# Patient Record
Sex: FEMALE | Race: WHITE | Employment: OTHER | URBAN - METROPOLITAN AREA
[De-identification: names, ages, dates, MRNs, and addresses within clinical notes are randomized per-mention and may not be internally consistent; named-entity substitution may affect disease eponyms.]

---

## 2019-01-07 ENCOUNTER — TRANSFERRED RECORDS (OUTPATIENT)
Dept: HEALTH INFORMATION MANAGEMENT | Facility: CLINIC | Age: 84
End: 2019-01-07

## 2019-05-31 LAB
ALT SERPL-CCNC: 20 IU/L (ref 8–45)
AST SERPL-CCNC: 25 IU/L (ref 2–40)
CHOLEST SERPL-MCNC: 237 MG/DL (ref 100–199)
CREAT SERPL-MCNC: 1.09 MG/DL (ref 0.57–1.11)
GFR SERPL CREATININE-BSD FRML MDRD: 48 ML/MIN/1.73M2
GLUCOSE SERPL-MCNC: 97 MG/DL (ref 65–100)
HDLC SERPL-MCNC: 49 MG/DL
LDLC SERPL CALC-MCNC: 156 MG/DL
NONHDLC SERPL-MCNC: 188 MG/DL
POTASSIUM SERPL-SCNC: 4.3 MMOL/L (ref 3.5–5)
TRIGL SERPL-MCNC: 161 MG/DL
TSH SERPL-ACNC: 3.42 UIU/ML (ref 0.35–4.94)

## 2019-06-16 ENCOUNTER — MEDICAL CORRESPONDENCE (OUTPATIENT)
Dept: HEALTH INFORMATION MANAGEMENT | Facility: CLINIC | Age: 84
End: 2019-06-16

## 2019-06-18 ENCOUNTER — MEDICAL CORRESPONDENCE (OUTPATIENT)
Dept: HEALTH INFORMATION MANAGEMENT | Facility: CLINIC | Age: 84
End: 2019-06-18

## 2019-06-18 DIAGNOSIS — Z86.73 HISTORY OF STROKE: Primary | ICD-10-CM

## 2019-06-21 ENCOUNTER — HOSPITAL ENCOUNTER (OUTPATIENT)
Dept: CARDIOLOGY | Facility: CLINIC | Age: 84
Discharge: HOME | End: 2019-06-21
Attending: PSYCHIATRY & NEUROLOGY | Admitting: PSYCHIATRY & NEUROLOGY
Payer: COMMERCIAL

## 2019-06-21 ENCOUNTER — HOSPITAL ENCOUNTER (OUTPATIENT)
Dept: ULTRASOUND IMAGING | Facility: CLINIC | Age: 84
End: 2019-06-21
Attending: PSYCHIATRY & NEUROLOGY
Payer: COMMERCIAL

## 2019-06-21 DIAGNOSIS — Z86.73 HISTORY OF STROKE: ICD-10-CM

## 2019-06-21 DIAGNOSIS — I63.9 STROKE (H): Primary | ICD-10-CM

## 2019-06-21 DIAGNOSIS — I63.9 STROKE (H): ICD-10-CM

## 2019-06-21 PROCEDURE — 93880 EXTRACRANIAL BILAT STUDY: CPT

## 2019-06-28 ENCOUNTER — HOSPITAL ENCOUNTER (OUTPATIENT)
Dept: CARDIOLOGY | Facility: CLINIC | Age: 84
Discharge: HOME | End: 2019-06-28
Attending: PSYCHIATRY & NEUROLOGY | Admitting: PSYCHIATRY & NEUROLOGY
Payer: COMMERCIAL

## 2019-06-28 DIAGNOSIS — I63.9 STROKE (H): ICD-10-CM

## 2019-06-28 PROCEDURE — 93226 XTRNL ECG REC<48 HR SCAN A/R: CPT

## 2019-06-28 PROCEDURE — 93227 XTRNL ECG REC<48 HR R&I: CPT | Performed by: INTERNAL MEDICINE

## 2019-09-20 LAB — EJECTION FRACTION: NORMAL %

## 2019-12-30 LAB
ALT SERPL-CCNC: 17 IU/L (ref 8–45)
AST SERPL-CCNC: 24 IU/L (ref 2–40)
CREAT SERPL-MCNC: 1.09 MG/DL (ref 0.57–1.11)
GFR SERPL CREATININE-BSD FRML MDRD: 48 ML/MIN/1.73M2
GLUCOSE SERPL-MCNC: 98 MG/DL (ref 65–100)
POTASSIUM SERPL-SCNC: 4.3 MMOL/L (ref 3.5–5)

## 2020-01-07 ENCOUNTER — MEDICAL CORRESPONDENCE (OUTPATIENT)
Dept: HEALTH INFORMATION MANAGEMENT | Facility: CLINIC | Age: 85
End: 2020-01-07

## 2020-01-07 ENCOUNTER — TRANSFERRED RECORDS (OUTPATIENT)
Dept: HEALTH INFORMATION MANAGEMENT | Facility: CLINIC | Age: 85
End: 2020-01-07

## 2020-01-08 LAB
CHOLEST SERPL-MCNC: 188 MG/DL (ref 100–199)
HDLC SERPL-MCNC: 49 MG/DL
LDLC SERPL CALC-MCNC: 117 MG/DL
NONHDLC SERPL-MCNC: 139 MG/DL
TRIGL SERPL-MCNC: 110 MG/DL

## 2020-01-21 ENCOUNTER — HOSPITAL ENCOUNTER (OUTPATIENT)
Dept: CARDIAC REHAB | Facility: CLINIC | Age: 85
End: 2020-01-21
Attending: INTERNAL MEDICINE
Payer: COMMERCIAL

## 2020-01-21 PROCEDURE — 93798 PHYS/QHP OP CAR RHAB W/ECG: CPT | Performed by: OCCUPATIONAL THERAPIST

## 2020-01-21 PROCEDURE — 40000116 ZZH STATISTIC OP CR VISIT: Performed by: OCCUPATIONAL THERAPIST

## 2020-01-21 PROCEDURE — 93797 PHYS/QHP OP CAR RHAB WO ECG: CPT | Performed by: OCCUPATIONAL THERAPIST

## 2020-01-21 PROCEDURE — 40000575 ZZH STATISTIC OP CARDIAC VISIT #2: Performed by: OCCUPATIONAL THERAPIST

## 2020-01-27 ENCOUNTER — HOSPITAL ENCOUNTER (OUTPATIENT)
Dept: CARDIAC REHAB | Facility: CLINIC | Age: 85
End: 2020-01-27
Attending: INTERNAL MEDICINE
Payer: COMMERCIAL

## 2020-01-27 PROCEDURE — 93798 PHYS/QHP OP CAR RHAB W/ECG: CPT | Performed by: REHABILITATION PRACTITIONER

## 2020-01-27 PROCEDURE — 40000116 ZZH STATISTIC OP CR VISIT: Performed by: REHABILITATION PRACTITIONER

## 2020-02-03 ENCOUNTER — HOSPITAL ENCOUNTER (OUTPATIENT)
Dept: CARDIAC REHAB | Facility: CLINIC | Age: 85
End: 2020-02-03
Attending: INTERNAL MEDICINE
Payer: COMMERCIAL

## 2020-02-03 PROCEDURE — 40000116 ZZH STATISTIC OP CR VISIT: Performed by: CLINICAL EXERCISE PHYSIOLOGIST

## 2020-02-03 PROCEDURE — 93798 PHYS/QHP OP CAR RHAB W/ECG: CPT | Performed by: CLINICAL EXERCISE PHYSIOLOGIST

## 2020-02-17 ENCOUNTER — HOSPITAL ENCOUNTER (OUTPATIENT)
Dept: CARDIAC REHAB | Facility: CLINIC | Age: 85
End: 2020-02-17
Attending: INTERNAL MEDICINE
Payer: COMMERCIAL

## 2020-02-17 PROCEDURE — 40000116 ZZH STATISTIC OP CR VISIT

## 2020-02-17 PROCEDURE — 93798 PHYS/QHP OP CAR RHAB W/ECG: CPT

## 2020-02-19 ENCOUNTER — HOSPITAL ENCOUNTER (OUTPATIENT)
Dept: CARDIAC REHAB | Facility: CLINIC | Age: 85
End: 2020-02-19
Attending: INTERNAL MEDICINE
Payer: COMMERCIAL

## 2020-02-19 PROCEDURE — 93798 PHYS/QHP OP CAR RHAB W/ECG: CPT

## 2020-02-19 PROCEDURE — 40000116 ZZH STATISTIC OP CR VISIT

## 2020-02-24 ENCOUNTER — HOSPITAL ENCOUNTER (OUTPATIENT)
Dept: CARDIAC REHAB | Facility: CLINIC | Age: 85
End: 2020-02-24
Attending: INTERNAL MEDICINE
Payer: COMMERCIAL

## 2020-02-24 PROCEDURE — 40000116 ZZH STATISTIC OP CR VISIT: Performed by: CLINICAL EXERCISE PHYSIOLOGIST

## 2020-02-24 PROCEDURE — 93798 PHYS/QHP OP CAR RHAB W/ECG: CPT | Performed by: CLINICAL EXERCISE PHYSIOLOGIST

## 2020-02-27 ENCOUNTER — HOSPITAL ENCOUNTER (OUTPATIENT)
Dept: CARDIAC REHAB | Facility: CLINIC | Age: 85
End: 2020-02-27
Attending: INTERNAL MEDICINE
Payer: COMMERCIAL

## 2020-02-27 PROCEDURE — 93798 PHYS/QHP OP CAR RHAB W/ECG: CPT | Performed by: REHABILITATION PRACTITIONER

## 2020-02-27 PROCEDURE — 40000116 ZZH STATISTIC OP CR VISIT: Performed by: REHABILITATION PRACTITIONER

## 2020-03-02 ENCOUNTER — HOSPITAL ENCOUNTER (OUTPATIENT)
Dept: CARDIAC REHAB | Facility: CLINIC | Age: 85
End: 2020-03-02
Attending: INTERNAL MEDICINE
Payer: COMMERCIAL

## 2020-03-02 PROCEDURE — 93798 PHYS/QHP OP CAR RHAB W/ECG: CPT | Performed by: CLINICAL EXERCISE PHYSIOLOGIST

## 2020-03-02 PROCEDURE — 40000116 ZZH STATISTIC OP CR VISIT: Performed by: CLINICAL EXERCISE PHYSIOLOGIST

## 2020-03-04 ENCOUNTER — HOSPITAL ENCOUNTER (OUTPATIENT)
Dept: CARDIAC REHAB | Facility: CLINIC | Age: 85
End: 2020-03-04
Attending: INTERNAL MEDICINE
Payer: COMMERCIAL

## 2020-03-04 PROCEDURE — 93798 PHYS/QHP OP CAR RHAB W/ECG: CPT

## 2020-03-04 PROCEDURE — 40000116 ZZH STATISTIC OP CR VISIT

## 2020-03-06 ENCOUNTER — HOSPITAL ENCOUNTER (OUTPATIENT)
Dept: CARDIAC REHAB | Facility: CLINIC | Age: 85
End: 2020-03-06
Attending: INTERNAL MEDICINE
Payer: COMMERCIAL

## 2020-03-06 PROCEDURE — 93798 PHYS/QHP OP CAR RHAB W/ECG: CPT | Performed by: OCCUPATIONAL THERAPIST

## 2020-03-06 PROCEDURE — 40000116 ZZH STATISTIC OP CR VISIT: Performed by: OCCUPATIONAL THERAPIST

## 2020-05-21 ENCOUNTER — HOSPITAL ENCOUNTER (OUTPATIENT)
Dept: CARDIAC REHAB | Facility: CLINIC | Age: 85
End: 2020-05-21
Attending: INTERNAL MEDICINE
Payer: COMMERCIAL

## 2020-05-21 PROCEDURE — 93798 PHYS/QHP OP CAR RHAB W/ECG: CPT | Performed by: CLINICAL EXERCISE PHYSIOLOGIST

## 2020-05-21 PROCEDURE — 40000116 ZZH STATISTIC OP CR VISIT: Performed by: CLINICAL EXERCISE PHYSIOLOGIST

## 2020-05-26 ENCOUNTER — HOSPITAL ENCOUNTER (OUTPATIENT)
Dept: CARDIAC REHAB | Facility: CLINIC | Age: 85
End: 2020-05-26
Attending: INTERNAL MEDICINE
Payer: COMMERCIAL

## 2020-05-26 PROCEDURE — 40000116 ZZH STATISTIC OP CR VISIT

## 2020-05-26 PROCEDURE — 93798 PHYS/QHP OP CAR RHAB W/ECG: CPT

## 2020-05-27 ENCOUNTER — HOSPITAL ENCOUNTER (OUTPATIENT)
Dept: CARDIAC REHAB | Facility: CLINIC | Age: 85
End: 2020-05-27
Attending: INTERNAL MEDICINE
Payer: COMMERCIAL

## 2020-05-27 PROCEDURE — 40000116 ZZH STATISTIC OP CR VISIT: Performed by: OCCUPATIONAL THERAPIST

## 2020-05-27 PROCEDURE — 93798 PHYS/QHP OP CAR RHAB W/ECG: CPT | Performed by: OCCUPATIONAL THERAPIST

## 2020-06-01 ENCOUNTER — HOSPITAL ENCOUNTER (OUTPATIENT)
Dept: CARDIAC REHAB | Facility: CLINIC | Age: 85
End: 2020-06-01
Attending: INTERNAL MEDICINE
Payer: COMMERCIAL

## 2020-06-01 PROCEDURE — 93798 PHYS/QHP OP CAR RHAB W/ECG: CPT | Performed by: CLINICAL EXERCISE PHYSIOLOGIST

## 2020-06-01 PROCEDURE — 40000116 ZZH STATISTIC OP CR VISIT: Performed by: CLINICAL EXERCISE PHYSIOLOGIST

## 2020-06-03 ENCOUNTER — HOSPITAL ENCOUNTER (OUTPATIENT)
Dept: CARDIAC REHAB | Facility: CLINIC | Age: 85
End: 2020-06-03
Attending: INTERNAL MEDICINE
Payer: COMMERCIAL

## 2020-06-03 PROCEDURE — 40000116 ZZH STATISTIC OP CR VISIT: Performed by: REHABILITATION PRACTITIONER

## 2020-06-03 PROCEDURE — 93798 PHYS/QHP OP CAR RHAB W/ECG: CPT | Performed by: REHABILITATION PRACTITIONER

## 2020-06-10 ENCOUNTER — HOSPITAL ENCOUNTER (OUTPATIENT)
Dept: CARDIAC REHAB | Facility: CLINIC | Age: 85
End: 2020-06-10
Attending: INTERNAL MEDICINE
Payer: COMMERCIAL

## 2020-06-10 PROCEDURE — 40000116 ZZH STATISTIC OP CR VISIT: Performed by: OCCUPATIONAL THERAPIST

## 2020-06-10 PROCEDURE — 93798 PHYS/QHP OP CAR RHAB W/ECG: CPT | Performed by: OCCUPATIONAL THERAPIST

## 2020-06-15 ENCOUNTER — HOSPITAL ENCOUNTER (OUTPATIENT)
Dept: CARDIAC REHAB | Facility: CLINIC | Age: 85
End: 2020-06-15
Attending: INTERNAL MEDICINE
Payer: COMMERCIAL

## 2020-06-15 PROCEDURE — 40000116 ZZH STATISTIC OP CR VISIT: Performed by: REHABILITATION PRACTITIONER

## 2020-06-15 PROCEDURE — 93798 PHYS/QHP OP CAR RHAB W/ECG: CPT | Performed by: REHABILITATION PRACTITIONER

## 2020-09-16 ENCOUNTER — HOSPITAL ENCOUNTER (OUTPATIENT)
Dept: CARDIAC REHAB | Facility: CLINIC | Age: 85
End: 2020-09-16
Attending: INTERNAL MEDICINE
Payer: COMMERCIAL

## 2020-09-16 PROCEDURE — 93798 PHYS/QHP OP CAR RHAB W/ECG: CPT | Performed by: OCCUPATIONAL THERAPIST

## 2020-09-16 PROCEDURE — 40000116 ZZH STATISTIC OP CR VISIT: Performed by: OCCUPATIONAL THERAPIST

## 2020-09-24 ENCOUNTER — HOSPITAL ENCOUNTER (OUTPATIENT)
Dept: CARDIAC REHAB | Facility: CLINIC | Age: 85
End: 2020-09-24
Attending: INTERNAL MEDICINE
Payer: COMMERCIAL

## 2020-09-24 PROCEDURE — 93798 PHYS/QHP OP CAR RHAB W/ECG: CPT

## 2020-09-24 PROCEDURE — 40000116 ZZH STATISTIC OP CR VISIT

## 2020-10-01 ENCOUNTER — HOSPITAL ENCOUNTER (OUTPATIENT)
Dept: CARDIAC REHAB | Facility: CLINIC | Age: 85
End: 2020-10-01
Attending: INTERNAL MEDICINE
Payer: COMMERCIAL

## 2020-10-01 PROCEDURE — 999N000109 HC STATISTIC OP CR VISIT

## 2020-10-01 PROCEDURE — 93798 PHYS/QHP OP CAR RHAB W/ECG: CPT

## 2020-10-08 ENCOUNTER — HOSPITAL ENCOUNTER (OUTPATIENT)
Dept: CARDIAC REHAB | Facility: CLINIC | Age: 85
End: 2020-10-08
Attending: INTERNAL MEDICINE
Payer: COMMERCIAL

## 2020-10-08 PROCEDURE — 999N000109 HC STATISTIC OP CR VISIT

## 2020-10-08 PROCEDURE — 93798 PHYS/QHP OP CAR RHAB W/ECG: CPT

## 2020-10-15 ENCOUNTER — HOSPITAL ENCOUNTER (OUTPATIENT)
Dept: CARDIAC REHAB | Facility: CLINIC | Age: 85
End: 2020-10-15
Attending: INTERNAL MEDICINE
Payer: COMMERCIAL

## 2020-10-15 PROCEDURE — 999N000109 HC STATISTIC OP CR VISIT: Performed by: REHABILITATION PRACTITIONER

## 2020-10-15 PROCEDURE — 93798 PHYS/QHP OP CAR RHAB W/ECG: CPT | Performed by: REHABILITATION PRACTITIONER

## 2020-10-22 ENCOUNTER — HOSPITAL ENCOUNTER (OUTPATIENT)
Dept: CARDIAC REHAB | Facility: CLINIC | Age: 85
End: 2020-10-22
Attending: INTERNAL MEDICINE
Payer: COMMERCIAL

## 2020-10-22 PROCEDURE — 93798 PHYS/QHP OP CAR RHAB W/ECG: CPT

## 2020-10-22 PROCEDURE — 999N000109 HC STATISTIC OP CR VISIT

## 2020-11-05 ENCOUNTER — HOSPITAL ENCOUNTER (OUTPATIENT)
Dept: CARDIAC REHAB | Facility: CLINIC | Age: 85
End: 2020-11-05
Attending: INTERNAL MEDICINE
Payer: COMMERCIAL

## 2020-11-05 PROCEDURE — 93798 PHYS/QHP OP CAR RHAB W/ECG: CPT

## 2020-11-05 PROCEDURE — 999N000109 HC STATISTIC OP CR VISIT

## 2020-11-12 ENCOUNTER — HOSPITAL ENCOUNTER (OUTPATIENT)
Dept: CARDIAC REHAB | Facility: CLINIC | Age: 85
End: 2020-11-12
Attending: INTERNAL MEDICINE
Payer: COMMERCIAL

## 2020-11-12 PROCEDURE — 93798 PHYS/QHP OP CAR RHAB W/ECG: CPT | Performed by: CLINICAL EXERCISE PHYSIOLOGIST

## 2020-11-12 PROCEDURE — 999N000109 HC STATISTIC OP CR VISIT: Performed by: CLINICAL EXERCISE PHYSIOLOGIST

## 2020-11-19 ENCOUNTER — HOSPITAL ENCOUNTER (OUTPATIENT)
Dept: CARDIAC REHAB | Facility: CLINIC | Age: 85
End: 2020-11-19
Attending: INTERNAL MEDICINE
Payer: COMMERCIAL

## 2020-11-19 PROCEDURE — 999N000109 HC STATISTIC OP CR VISIT

## 2020-11-19 PROCEDURE — 93798 PHYS/QHP OP CAR RHAB W/ECG: CPT

## 2020-12-03 ENCOUNTER — HOSPITAL ENCOUNTER (OUTPATIENT)
Dept: CARDIAC REHAB | Facility: CLINIC | Age: 85
End: 2020-12-03
Attending: INTERNAL MEDICINE
Payer: COMMERCIAL

## 2020-12-03 PROCEDURE — 999N000109 HC STATISTIC OP CR VISIT

## 2020-12-03 PROCEDURE — 93798 PHYS/QHP OP CAR RHAB W/ECG: CPT

## 2020-12-10 ENCOUNTER — HOSPITAL ENCOUNTER (OUTPATIENT)
Dept: CARDIAC REHAB | Facility: CLINIC | Age: 85
End: 2020-12-10
Attending: INTERNAL MEDICINE
Payer: COMMERCIAL

## 2020-12-10 PROCEDURE — 999N000109 HC STATISTIC OP CR VISIT

## 2020-12-10 PROCEDURE — 93798 PHYS/QHP OP CAR RHAB W/ECG: CPT

## 2021-03-14 ENCOUNTER — APPOINTMENT (OUTPATIENT)
Dept: CT IMAGING | Facility: CLINIC | Age: 86
End: 2021-03-14
Attending: EMERGENCY MEDICINE
Payer: COMMERCIAL

## 2021-03-14 ENCOUNTER — HOSPITAL ENCOUNTER (EMERGENCY)
Facility: CLINIC | Age: 86
Discharge: HOME | End: 2021-03-14
Attending: EMERGENCY MEDICINE | Admitting: EMERGENCY MEDICINE
Payer: COMMERCIAL

## 2021-03-14 VITALS
TEMPERATURE: 97.5 F | OXYGEN SATURATION: 97 % | HEART RATE: 63 BPM | RESPIRATION RATE: 18 BRPM | DIASTOLIC BLOOD PRESSURE: 72 MMHG | SYSTOLIC BLOOD PRESSURE: 167 MMHG

## 2021-03-14 DIAGNOSIS — T07.XXXA MULTIPLE CONTUSIONS: ICD-10-CM

## 2021-03-14 DIAGNOSIS — S81.811A SKIN TEAR OF RIGHT LOWER LEG WITHOUT COMPLICATION, INITIAL ENCOUNTER: ICD-10-CM
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
## 2021-03-14 DIAGNOSIS — S02.92XA MULTIPLE CLOSED FRACTURES OF FACIAL BONE, INITIAL ENCOUNTER (H): ICD-10-CM

## 2021-03-14 DIAGNOSIS — S01.81XA FACIAL LACERATION, INITIAL ENCOUNTER: ICD-10-CM

## 2021-03-14 PROCEDURE — 70450 CT HEAD/BRAIN W/O DYE: CPT

## 2021-03-14 PROCEDURE — 99284 EMERGENCY DEPT VISIT MOD MDM: CPT | Mod: 25

## 2021-03-14 PROCEDURE — 250N000009 HC RX 250: Performed by: EMERGENCY MEDICINE

## 2021-03-14 PROCEDURE — 12013 RPR F/E/E/N/L/M 2.6-5.0 CM: CPT

## 2021-03-14 PROCEDURE — 70486 CT MAXILLOFACIAL W/O DYE: CPT

## 2021-03-14 RX ORDER — LIDOCAINE HYDROCHLORIDE 20 MG/ML
5 SOLUTION OROPHARYNGEAL ONCE
Status: COMPLETED | OUTPATIENT
Start: 2021-03-14 | End: 2021-03-14

## 2021-03-14 RX ADMIN — LIDOCAINE HYDROCHLORIDE 5 ML: 20 SOLUTION ORAL; TOPICAL at 13:20

## 2021-03-14 ASSESSMENT — ENCOUNTER SYMPTOMS: WOUND: 1

## 2021-03-14 NOTE — ED TRIAGE NOTES
Pt arrives via EMS for evaluation following fall and head injury. Pt presents from [place] where pt tripped on the escalator. Pt hit head on right side of her face. Swelling on right side of the face. Pt's nose started to bleed when EMS arrived. Abrasion on bleeding of right lower leg. Pt takes Plavix.

## 2021-03-14 NOTE — ED NOTES
Bed: ED28  Expected date:   Expected time:   Means of arrival:   Comments:  Vandana 436 Fall off escalator lac 85 f

## 2021-03-14 NOTE — CONSULTS
"[facility] TRAUMA CONSULTATION NOTE    ASSESSMENT :   Mariam Azar is an 85 year old female with PMH significant for CAD, HTN, depression, and sciatica s/p ground-level fall sustaining right minimally-displaced zygomaticomaxillary complex fracture with right brow laceration.     PLAN/RECOMMENDATIONS:   1. No acute surgical intervention indicated  2. Strict sinus precautions (No nose blowing, no closed mouth sneezing, no smoking, no straws)  3. Head of bed elevated  4. Ice to face, 20min on/off for first 48hrs  5. Re-eval in 8 days (3/22) for suture removal, abx ointment TID until removed  6. Stable for discharge from [facility] perspective      Brant Ramirez DDS, MD  [facility]  [address], [address]  [place] [address]  Clinic/On call [phone]  Clinic Fax [phone]    If questions, please call  and ask to be connected to Oral and Maxillofacial Surgeon on call    CHIEF COMPLAINT:  \"I fell at the end of the escalator\"    HISTORY OF PRESENT ILLNESS:   Mariam Azar is an 85 year old female with PMH significant for CAD, HTN, depression, and sciatica s/p ground-level fall sustaining right minimally-displaced zygomaticomaxillary complex fracture with right brow laceration. Patient was going down the cart escalator at [facility] when she fell at the bottom, hitting her right cheek on the concrete. No LOC. Denies HA, blurred/double vision, n/V. Patient was transported to [facility] via ambulance. Patient is on Plavix.     Prior to examination, the patient underwent CT of the face and head revealing right ZMC fracture. Facial laceration was closed by ED with non-resorbable suture.        REVIEW OF SYSTEMS:  Please refer to HPI.    PAST MEDICAL HISTORY:  Past Medical History:   Diagnosis Date     Depressive disorder      Hypertension        CURRENT MEDICATIONS:     No current facility-administered medications for this encounter.      Current Outpatient Medications   Medication "     ASPIRIN ADULT LOW STRENGTH PO     Citalopram Hydrobromide (CELEXA PO)     METOPROLOL TARTRATE PO     oxybutynin (DITROPAN-XL) 5 MG 24 hr tablet       ALLERGIES/SENSITIVITIES:  No Known Allergies    PAST SURGICAL HISTORY:  Past Surgical History:   Procedure Laterality Date     BREAST SURGERY      breast reduction     COLONOSCOPY N/A 12/8/2015    Procedure: COLONOSCOPY;  Surgeon: Anand Cameron MD;  Location:  GI     ENT SURGERY      tonsillectomy   Denies bleeding or anesthesia complications.    FAMILY HISTORY:  Denies bleeding or anesthesia complications.     SOCIAL HISTORY:  Social History     Socioeconomic History     Marital status: Single     Spouse name: Not on file     Number of children: Not on file     Years of education: Not on file     Highest education level: Not on file   Occupational History     Not on file   Social Needs     Financial resource strain: Not on file     Food insecurity     Worry: Not on file     Inability: Not on file     Transportation needs     Medical: Not on file     Non-medical: Not on file   Tobacco Use     Smoking status: Never Smoker   Substance and Sexual Activity     Alcohol use: No     Drug use: Not on file     Sexual activity: Not on file   Lifestyle     Physical activity     Days per week: Not on file     Minutes per session: Not on file     Stress: Not on file   Relationships     Social connections     Talks on phone: Not on file     Gets together: Not on file     Attends Worship service: Not on file     Active member of club or organization: Not on file     Attends meetings of clubs or organizations: Not on file     Relationship status: Not on file     Intimate partner violence     Fear of current or ex partner: Not on file     Emotionally abused: Not on file     Physically abused: Not on file     Forced sexual activity: Not on file   Other Topics Concern     Parent/sibling w/ CABG, MI or angioplasty before 65F 55M? Not Asked   Social History Narrative      Not on file       PHYSICAL EXAM:  Vital signs:   BP (!) 167/72   Pulse 63   Temp 97.5  F (36.4  C) (Temporal)   Resp 18   SpO2 97%    Gen: Sitting in bed, alert, awake, and oriented x 3, No acute distress.  Head: Normocephalic, atraumatic. No scalp lacerations or hematomas.   Eyes: Extra-ocular movement intact, pupils equal, round, and reactive to light and accomodation. No lid edema, ecchymosis. Fields of gaze intact. 3cm right lateral brow laceration repaired with 5 interrupted prolene suture.   Ears: Hearing grossly intact bilaterally with hearing aids. No blood noted in the external meatus. No lacerations or hematoma. No harris's signs.  Nose: No deviation. No tenderness to palpation or crepitus. No lacerations. No septal hematoma. Nares patent bilaterally. Some dried blood in bilateral nares.   Extra-oral: Some right TMJ tenderness. No TMJ clicking, popping. No trismus or mandibular deviation on opening. No CN V1 or V3 paresthesia. Right V2 paresthesia. No cervical lymphadenopathy. No thyromegaly. Inferior borders and angles of bilateral mandible are palpable.   Intra-oral: No lip or buccal mucosal lacerations or lesions. Occlusion stable and reproducible with positive overbite and overjet. No occlusal steps or gingival tears. No tongue or floor of mouth lesions, lacerations or hematomas. Floor of mouth non-elevated. Uvula midline.No palatal or vestibular swelling. Oral hygiene fair. No traumatically fractured or missing teeth.   CV: Normal peripheral perfusion. Regular rate and rhythm.  Lungs: Non-labored breathing. Satting above 92% on room air.    Neuro: Moving all extremities  Psych: Cooperative, appropriate affect    LABS:  No visits with results within 1 Day(s) from this visit.   Latest known visit with results is:   Transferred Records on 01/07/2020   Component Date Value Ref Range Status     Cholesterol 01/08/2020 188  100 - 199 mg/dL Final     Triglycerides 01/08/2020 110  <150 mg/dL Final     HDL  Cholesterol 01/08/2020 49  >40 mg/dL Final     LDL Cholesterol Calculated 01/08/2020 117  <=130 mg/dL Final     Non HDL Cholesterol 01/08/2020 139  <145 mg/dL Final     ALT 12/30/2019 17  8 - 45 IU/L Final     AST 12/30/2019 24  2 - 40 IU/L Final     Creatinine 12/30/2019 1.09  0.57 - 1.11 mg/dL Final     GFR Estimate 12/30/2019 48* >60 ml/min/1.73m2 Final     GFR Estimate If Black 12/30/2019 58* >60 ml/min/1.73m2 Final     Glucose 12/30/2019 98  65 - 100 mg/dL Final     Potassium 12/30/2019 4.3  3.5 - 5.0 mmol/L Final     ALT 05/31/2019 20  8 - 45 IU/L Final     AST 05/31/2019 25  2 - 40 IU/L Final     Creatinine 05/31/2019 1.09  0.57 - 1.11 mg/dL Final     GFR Estimate 05/31/2019 48* >60 ml/min/1.73m2 Final     GFR Estimate If Black 05/31/2019 58* >60 ml/min/1.73m2 Final     Glucose 05/31/2019 97  65 - 100 mg/dL Final     Potassium 05/31/2019 4.3  3.5 - 5.0 mmol/L Final     Cholesterol 05/31/2019 237* 100 - 199 mg/dL Final     Triglycerides 05/31/2019 161* <150 mg/dL Final     HDL Cholesterol 05/31/2019 49  >40 mg/dL Final     LDL Cholesterol Calculated 05/31/2019 156* <=130 mg/dL Final     Non HDL Cholesterol 05/31/2019 188* <145 mg/dL Final     TSH 05/31/2019 3.42  0.35 - 4.94 uIU/mL Final     Ejection Fraction 09/20/2019 55-60  % Final        IMAGING:  The following imaging studies were reviewed. I agree with the radiologist's interpretation unless otherwise noted.   No images are attached to the encounter.   Results for orders placed or performed during the hospital encounter of 03/14/21   CT Head w/o Contrast    Narrative    CT HEAD WITHOUT CONTRAST 3/14/2021 1:12 PM    INDICATION: Head trauma, minor (Age >= 65y).  TECHNIQUE: CT scan of the head without contrast. Dose reduction  techniques were used.  CONTRAST:  None.  COMPARISON: Facial bone CT 3/14/2021    FINDINGS:  No intracranial hemorrhage, extraaxial collection, mass  effect or CT evidence of acute infarct.  Mild-to-moderate presumed  chronic small  vessel ischemic changes. Mild generalized volume loss.  The ventricles are proportional to the sulci. Please see the  separately reported facial bone CT regarding the right facial bone  fractures affecting the right orbit and maxillary sinus. Blood  occupies most of the visualized right maxillary sinus. Clear mastoid  air cells.      Impression    IMPRESSION:  1. No acute intracranial injury.  2. Please see the separately reported facial bone CT regarding the  right facial fractures.    JUS WALTON MD   CT Facial Bones without Contrast    Narrative    CT FACIAL BONES WITHOUT CONTRAST 3/14/2021 1:11 PM    INDICATION: Trauma, facial injury.  TECHNIQUE: CT scan of the face without contrast. Dose reduction  techniques were used.  CONTRAST: None.  COMPARISON: None.    FINDINGS:   There are acute minimally displaced fractures of the anterior and  posterior lateral walls of the right maxillary sinus and overlying  orbital floor and inferolateral orbital wall. The orbital floor  fracture involves the infraorbital canal. There is a very small amount  of intraorbital hemorrhage near the inferior orbital fissure opening.  Extraocular muscles, globe and lens appear intact by CT. There is some  buckling of the right zygomatic arch without definite fracture. There  is subcutaneous swelling and hematoma overlying the right face within  the  space. Mandible appears intact. Blood occupies most of  the right maxillary sinus.      Impression    IMPRESSION:  1. Multiple acute right facial bone fractures with features of a  zygomaticomaxillary complex injury. Minimally displaced fractures  involve the inferior and lateral orbital walls and anterior and  posterior maxillary sinus walls.  2. Trace amount of intraorbital hematoma. No proptosis.  3. Right facial swelling/hematoma within the  space.  Mandible appears intact.    JUS WALTON MD

## 2021-03-14 NOTE — ED PROVIDER NOTES
History   Chief Complaint:  Head Injury and Fall       HPI   Mariam Azar is a 85 year old female on plavix with history of CVA and coronary artery disease who presents with a head injury after a fall. The patient's cart got stuck at the bottom of the escalator while at [employer] this morning and she fell. The patient hit her head and right lower leg on the escalator. She complains of pain to the right side of her face, bloody nose, and abrasion to the right lower leg. She was able to walk for EMS. EMS states that the patient's last blood pressure was 140 systolic and heart rate was in the 50s. EMS notes that the patient's last dose of plavix was yesterday in preparation for her upcoming surgery this Thursday.     Review of Systems   HENT: Positive for nosebleeds.         Right sided facial pain   Skin: Positive for wound (abrasion to right lower leg).   10 systems reviewed and negative except as above and in HPI.     Allergies:  No Known Allergies    Medications:  Citalopram Hydrobromide   Metoprolol   Oxybutynin  plavix   Synthroid   crestor  Amlodipine   Aldactone     Past Medical History:    Hypertension   Depressive disorder   Hypothyroidism   Hyperlipidemia    Anxiety   Coronary artery disease   CVA  gout    Past Surgical History:    Breast reduction   Tonsillectomy   carotid angiography stenting     Family History:    Heart attack   Hypertension   Multiple myeloma   Diabetes     Social History:  The patient presents by EMS.   PCP: Edouard Wolf MD      Physical Exam     Patient Vitals for the past 24 hrs:   BP Temp Temp src Pulse Resp SpO2   03/14/21 1330 -- -- -- -- -- 97 %   03/14/21 1240 -- -- -- -- -- 96 %   03/14/21 1220 -- -- -- -- -- 96 %   03/14/21 1215 (!) 167/72 97.5  F (36.4  C) Temporal 63 18 93 %       Physical Exam  General: Resting on the gurney, appears  uncomfortable  Head:  Epistaxis present resolved shortly after my evaluation, 3 cm laceration to the right eyebrow,  periorbital edema and contusion, no hyphema, no pain with extraocular movements, Right cheek swollen and bruised, tenderness over the right maxillary sinus, no tenderness over the mandible, opens mouth without difficulty  Neck:  No midline tenderness to palpation  Mouth/Throat: No dental malalignment  CV:  Regular rate    Normal S1 and S2  No pathological murmur   Resp:  Breath sounds clear and equal bilaterally    Non-labored, no retractions or accessory muscle use    No coarseness    No wheezing   GI:  Abdomen is soft, no rigidity    No tenderness to palpation  MS:  Normal motor assessment of all extremities. Back has no tenderness to palpation  Skin:   left shin large contusion, right shoulder contusion, right shin skin avulsion overlying a large hematoma  Neuro:   Speech is normal and fluent. No apparent deficit.  Psych: Awake. Alert.  Normal affect.      Appropriate interactions.       Emergency Department Course     Imaging:  CT Head w/o Contrast  Final Result  IMPRESSION:  1. No acute intracranial injury.  2. Please see the separately reported facial bone CT regarding the  right facial fractures.  JUS WALTON MD  Reading per radiology     CT Facial Bones without Contrast  Final Result  IMPRESSION:  1. Multiple acute right facial bone fractures with features of a  zygomaticomaxillary complex injury. Minimally displaced fractures  involve the inferior and lateral orbital walls and anterior and  posterior maxillary sinus walls.  2. Trace amount of intraorbital hematoma. No proptosis.  3. Right facial swelling/hematoma within the  space.  Mandible appears intact.  JUS WALTON MD  Reading per radiology     Procedures    Laceration Repair        LACERATION:  A simple and superficial clean 3 cm laceration.      LOCATION:  Right eyebrow      FUNCTION:  Distally sensation and circulation are intact.      ANESTHESIA:  Topical lidocaine 2% total of 5 mLs      PREPARATION:  Irrigation and  Scrubbing with Jm Robertson      DEBRIDEMENT:  no debridement      CLOSURE:  Wound was closed with One Layer.  Skin closed with 5 x 6.0 Ethylon using interrupted sutures.     Emergency Department Course:    Reviewed:  I reviewed the patient's nursing notes, vitals, past medical records, Care Everywhere.     Assessments:  1209  I performed an exam of the patient as documented above.   1420 Patient rechecked and updated. The laceration was repaired as noted above.     Consults:   1400 I spoke with Dr. Ramirez of the [facility] service regarding patient's presentation, findings, and plan of care.      Interventions:  1320 Lidocaine 2% 5 mL topical     Disposition:  Discharged to home.      Impression & Plan     Medical Decision Making:    The patient presents for evaluation of a head injury.  Due to the patient's anticoagulation, they were at risk of intracranial hemorrhage.  CT of the head was obtained and is negative for acute ICH or skull fracture.  The CT head result was discussed with the patient.  The patient's head laceration was repaired as noted above.  Pt was given wound care instructions and will follow up for suture removal.  The leg wound was repaired with steristrips.   Given patients maxillary and orbit injuries and anticoagulation, I contacted [facility] to discuss follow up and risk for enlarging hematoma.  He graciously agreed to see the pt in the ED and saw her at bedside.  He will have the patient come to see him in clinic and gave sinus precautions.  No antibiotics per his recommendation.    Based on a full exam, I did not have concern for any additional traumatic injuries. As she had no bony tenderenss or pain with full range of motion.  The pt describes a mechanical accident and therefore no additional evaluation for metabolic or cardiac etiology was warranted at this time.     Diagnosis:    ICD-10-CM    1. Multiple closed fractures of facial bone, initial encounter (H)  S02.92XA    2. Facial laceration,  initial encounter  S01.81XA    3. Skin tear of right lower leg without complication, initial encounter  S81.811A    4. Multiple contusions  T07.XXXA    5. Fall on or from escalator, initial encounter  W10.0XXA        Scribe Disclosure:  I, Gregg Carter, am serving as a scribe at 12:09 PM on 3/14/2021 to document services personally performed by Rochelle Moncada MD based on my observations and the provider's statements to me.        Rochelle Moncada MD  03/14/21 1834
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
## 2021-03-20 ENCOUNTER — HEALTH MAINTENANCE LETTER (OUTPATIENT)
Age: 86
End: 2021-03-20

## 2021-10-24 ENCOUNTER — HEALTH MAINTENANCE LETTER (OUTPATIENT)
Age: 86
End: 2021-10-24

## 2022-04-10 ENCOUNTER — HEALTH MAINTENANCE LETTER (OUTPATIENT)
Age: 87
End: 2022-04-10

## 2022-05-04 ENCOUNTER — PRE VISIT (OUTPATIENT)
Dept: CARDIOLOGY | Facility: CLINIC | Age: 87
End: 2022-05-04
Payer: COMMERCIAL

## 2022-05-04 PROBLEM — I65.29 CAROTID ARTERY PLAQUE: Status: ACTIVE | Noted: 2022-05-04

## 2022-05-04 PROBLEM — I25.10 CORONARY ARTERY DISEASE INVOLVING NATIVE CORONARY ARTERY OF NATIVE HEART WITHOUT ANGINA PECTORIS: Status: ACTIVE | Noted: 2022-05-04

## 2022-05-04 PROBLEM — M48.00 SPINAL STENOSIS: Status: ACTIVE | Noted: 2022-05-04

## 2022-05-04 PROBLEM — M10.9 GOUT: Status: ACTIVE | Noted: 2022-05-04

## 2022-05-04 PROBLEM — R25.1 TREMOR: Status: ACTIVE | Noted: 2022-05-04

## 2022-05-04 PROBLEM — D64.9 ANEMIA: Status: ACTIVE | Noted: 2022-05-04

## 2022-05-04 PROBLEM — E03.9 HYPOTHYROIDISM: Status: ACTIVE | Noted: 2022-05-04

## 2022-05-04 PROBLEM — I34.0 MITRAL VALVE REGURGITATION: Status: ACTIVE | Noted: 2022-05-04

## 2022-05-04 PROBLEM — E78.5 HYPERLIPIDEMIA LDL GOAL <70: Status: ACTIVE | Noted: 2022-05-04

## 2022-06-01 ENCOUNTER — OFFICE VISIT (OUTPATIENT)
Dept: CARDIOLOGY | Facility: CLINIC | Age: 87
End: 2022-06-01
Payer: COMMERCIAL

## 2022-06-01 VITALS
BODY MASS INDEX: 21.97 KG/M2 | SYSTOLIC BLOOD PRESSURE: 150 MMHG | WEIGHT: 136.7 LBS | DIASTOLIC BLOOD PRESSURE: 71 MMHG | HEIGHT: 66 IN | HEART RATE: 69 BPM

## 2022-06-01 DIAGNOSIS — I25.10 CORONARY ARTERY DISEASE INVOLVING NATIVE CORONARY ARTERY OF NATIVE HEART WITHOUT ANGINA PECTORIS: Primary | ICD-10-CM

## 2022-06-01 DIAGNOSIS — I34.0 NONRHEUMATIC MITRAL VALVE REGURGITATION: ICD-10-CM

## 2022-06-01 DIAGNOSIS — E78.5 HYPERLIPIDEMIA LDL GOAL <70: ICD-10-CM

## 2022-06-01 DIAGNOSIS — I10 BENIGN ESSENTIAL HYPERTENSION: ICD-10-CM

## 2022-06-01 PROCEDURE — 99215 OFFICE O/P EST HI 40 MIN: CPT | Performed by: INTERNAL MEDICINE

## 2022-06-01 PROCEDURE — 93000 ELECTROCARDIOGRAM COMPLETE: CPT | Performed by: INTERNAL MEDICINE

## 2022-06-01 RX ORDER — ROSUVASTATIN CALCIUM 20 MG/1
20 TABLET, COATED ORAL AT BEDTIME
COMMUNITY
Start: 2022-04-05

## 2022-06-01 RX ORDER — CARBIDOPA AND LEVODOPA 25; 100 MG/1; MG/1
TABLET ORAL
COMMUNITY
Start: 2022-03-18

## 2022-06-01 RX ORDER — AMLODIPINE BESYLATE 5 MG/1
5 TABLET ORAL DAILY
COMMUNITY
Start: 2022-05-17

## 2022-06-01 RX ORDER — LEVOTHYROXINE SODIUM 25 UG/1
TABLET ORAL
COMMUNITY
Start: 2022-04-14

## 2022-06-01 RX ORDER — METOPROLOL SUCCINATE 25 MG/1
25 TABLET, EXTENDED RELEASE ORAL DAILY
Qty: 90 TABLET | Refills: 3
Start: 2022-06-01 | End: 2022-06-21

## 2022-06-01 NOTE — PROGRESS NOTES
Service Date: 06/01/2022    HISTORY OF PRESENT ILLNESS:  Mariam is seeking me out as a second opinion and transfer of care.  She formerly followed through [facility].  Her daughter knows my wife and neighbor, Corrie Molina.    PAST MEDICAL HISTORY:  Mariam's past medical history is significant for hypertension, hyperlipidemia, hypothyroidism, Parkinson's disease and known coronary artery disease.    Her coronary history dates back to 01/2020, when she had 5 drug-eluting stents placed to her proximal and mid right coronary artery as well as ostial, proximal circumflex and mid circumflex.  In 09/2020, due to recurrent exertional symptoms, a coronary CT angiogram appeared to demonstrate severe in-stent restenosis in the left anterior descending artery.  Angiography demonstrated iFR-negative disease in the LAD and she underwent angioplasty to in-stent restenosis in her left circumflex coronary artery.  Her September angiogram was complicated by a shower of small strokes noted by MRI.  Her last angiogram also noted a second obtuse marginal that was totally occluded.  Her symptoms post-second angiogram included diplopia and right-sided facial droop.    Mariam returns to clinic at this time, stating that she thinks she is doing fairly well.  She walks a mile 4-5 times a week.  She gets this done under 25 minutes and this does not cause her any symptoms.  She states her blood pressure is checked twice a month and always is in the 125-130 range over 60s.  She perseverates over whether she has Parkinson's.  She has seen 3 different neurologists over the years, 2 have told her that she does not have it and 1 told that she does have it.  I told her I am a cardiologist and since she has 3 neurologists, I would let them decide whether she actually has Parkinson's or essential tremor.    She is on aspirin only.  She stopped her Plavix last September.  She notes no side effects or problems with her current medical  regimen, although has many questions about her medications.    ASSESSMENT/PLAN:  Mariam appears to be doing well from a cardiac standpoint without clinical evidence of ischemia.  Review of Care Everywhere shows she did have an echocardiogram performed in 10/2021, demonstrating an ejection fraction of 65%-70% without focal wall motion abnormalities.  She has a sclerotic mitral valve with mild mitral regurgitation.  Pulmonary pressure is estimated to be 29 mmHg plus her right atrial pressure.    She has no symptoms to suggest heart failure or significant arrhythmia.    Blood pressure was high today at 150/71.  Although given the fact that she is getting her blood pressure checked regularly and it is not normally high, I am not going to intensify her antihypertensive regimen.  We talked about arterial noncompliance.    I congratulated her on her exercise regimen and encouraged her to continue to do so.    Care Everywhere also reveals a fasting lipid profile from August.  Total cholesterol 133, HDL 59, LDL of 51, triglycerides of 114.  We talked about the individual parameters and what these mean.    We reviewed all of her medications.  I will continue them all as is.    Her other complaint is that of fatigue and tiredness and when I talked to her, she states she sleeps 8-9 hours a day.  She has daytime somnolence and feels that she needs to take a nap.   I told her that she most likely has a sleep disorder.  She does snore at nighttime.  I have offered her a sleep study.  She states she had one 10 years ago.  She just wanted to make sure it was not a side effect of medications or a sign of a heart problem.  At this time, she does not want to pursue a sleep study.    Almost an hour was spent with Mariam and her daughter today.  I will have her follow up with [facility] in 6 months.  I will see her back in a year.  If she should have any problems, I would be glad to see her sooner.    Thank you for allowing me to participate  in her care.    Jomar Zelaya MD, FACC        D: 2022   T: 2022   MT: [id number]    Name:     SHAMEKA TURNER  MRN:      [id number]        Account:      [id number]   :      1935           Service Date: 2022       Document: [id number]

## 2022-06-01 NOTE — PATIENT INSTRUCTIONS
Your blood pressure was elevated at your appointment today.  Elevated blood pressure can increase your risk of a heart attack, stroke and heart failure.  For this reason, we feel it is important to monitor your blood pressure closely.  If you have access to a home blood pressure monitor or are able to check your blood pressure at a local pharmacy in the next week we would like you to do so and call our clinic with those readings. Please call [phone] (Danae) and leave a message with your name, date of birth, blood pressure reading, date and location it was completed. If your blood pressure remains elevated your care team will be notified.  We appreciate being a part of your healthcare team and look forward to hearing from you soon.

## 2022-06-01 NOTE — LETTER
6/1/2022    Edouard Wolf MD  [employer] [address]  [facility] [address]    RE: Mariam Azar       Dear Colleague,     I had the pleasure of seeing Mariam Azar in the [facility].  HPI and Plan:   See dictation    Today's clinic visit entailed:  Review of the result(s) of each unique test - EKG, echocardiogram, lab work, cath lab reports  The following tests were independently interpreted by me as noted in my documentation: EKG  Ordering of each unique test  Prescription drug management  45 minutes spent on the date of the encounter doing chart review, history and exam, documentation and further activities per the note  Provider  Link to MDM Help Grid     The level of medical decision making during this visit was of moderate complexity.      Orders Placed This Encounter   Procedures     Basic metabolic panel     Lipid Profile     ALT     [facility]     Follow-Up with Cardiology     EKG 12-lead complete w/read - Clinics (performed today)       Orders Placed This Encounter   Medications     levothyroxine (SYNTHROID/LEVOTHROID) 25 MCG tablet     Sig: TAKE 1 TABLET (25 MCG) BY MOUTH BEFORE BREAKFAST.     amLODIPine (NORVASC) 5 MG tablet     Sig: Take 5 mg by mouth daily     rosuvastatin (CRESTOR) 20 MG tablet     Sig: Take 20 mg by mouth At Bedtime     carbidopa-levodopa (SINEMET)  MG tablet     Sig: TAKE 1 TABLET BY MOUTH TWICE A DAY     metoprolol succinate ER (TOPROL XL) 25 MG 24 hr tablet     Sig: Take 1 tablet (25 mg) by mouth daily     Dispense:  90 tablet     Refill:  3       Medications Discontinued During This Encounter   Medication Reason     METOPROLOL TARTRATE PO          Encounter Diagnoses   Name Primary?     Coronary artery disease involving native coronary artery of native heart without angina pectoris Yes     Hyperlipidemia LDL goal <70      Nonrheumatic mitral valve regurgitation      Benign essential hypertension        CURRENT  MEDICATIONS:  Current Outpatient Medications   Medication Sig Dispense Refill     amLODIPine (NORVASC) 5 MG tablet Take 5 mg by mouth daily       ASPIRIN ADULT LOW STRENGTH PO        carbidopa-levodopa (SINEMET)  MG tablet TAKE 1 TABLET BY MOUTH TWICE A DAY       Citalopram Hydrobromide (CELEXA PO) Take 10 mg by mouth       levothyroxine (SYNTHROID/LEVOTHROID) 25 MCG tablet TAKE 1 TABLET (25 MCG) BY MOUTH BEFORE BREAKFAST.       metoprolol succinate ER (TOPROL XL) 25 MG 24 hr tablet Take 1 tablet (25 mg) by mouth daily 90 tablet 3     oxybutynin (DITROPAN-XL) 5 MG 24 hr tablet Take by mouth daily       rosuvastatin (CRESTOR) 20 MG tablet Take 20 mg by mouth At Bedtime         ALLERGIES     Allergies   Allergen Reactions     Atorvastatin Muscle Pain (Myalgia)     Evolocumab Other (See Comments)     Stiff neck, stiff jaw, runny nose       PAST MEDICAL HISTORY:  Past Medical History:   Diagnosis Date     Anemia      Carotid artery plaque      Coronary artery disease involving native coronary artery of native heart without angina pectoris     cardiac cath 9/2020 @ [facility]: MARCIE to circumflex; cath 1/2020 @ [facility]: MARCIE x2 to RCA and MARCIE x3 to circumflex     Depressive disorder      Gout      Hyperlipidemia LDL goal <70      Hypertension      Hypothyroidism      Mitral valve regurgitation      Spinal stenosis     lumbar ablation 3/2021 @ [facility]     Tremor        PAST SURGICAL HISTORY:  Past Surgical History:   Procedure Laterality Date     BREAST SURGERY      breast reduction     COLONOSCOPY N/A 12/8/2015    Procedure: COLONOSCOPY;  Surgeon: Anand Cameron MD;  Location:  GI     ENT SURGERY      tonsillectomy       FAMILY HISTORY:  No family history on file.    SOCIAL HISTORY:  Social History     Socioeconomic History     Marital status: Single     Spouse name: None     Number of children: None     Years of education: None     Highest education level: None   Tobacco Use     Smoking status: Never Smoker      "Smokeless tobacco: Never Used   Substance and Sexual Activity     Alcohol use: Yes     Comment: occasional       Review of Systems:  Skin:  Negative       Eyes:  Negative      ENT:  Negative      Respiratory:  Negative shortness of breath;dyspnea on exertion     Cardiovascular:  Negative;palpitations;chest pain;edema;dizziness;lightheadedness Positive for;fatigue    Gastroenterology: Negative      Genitourinary:  Negative      Musculoskeletal:  Positive for neck pain    Neurologic:  Negative headaches    Psychiatric:  Negative      Heme/Lymph/Imm:  Positive for allergies    Endocrine:  Positive for thyroid disorder      Physical Exam:  Vitals: BP (!) 150/71 (BP Location: Left arm, Patient Position: Sitting)   Pulse 69   Ht 1.676 m (5' 6\")   Wt 62 kg (136 lb 11.2 oz)   BMI 22.06 kg/m      Constitutional:  cooperative, alert and oriented, well developed, well nourished, in no acute distress appears younger than stated age      Skin:  warm and dry to the touch, no apparent skin lesions or masses noted          Head:  normocephalic, no masses or lesions        Eyes:  pupils equal and round, conjunctivae and lids unremarkable, sclera white, no xanthalasma, EOMS intact, no nystagmus        Lymph:      ENT:  no pallor or cyanosis, dentition good        Neck:  carotid pulses are full and equal bilaterally;no carotid bruit        Respiratory:  normal breath sounds, clear to auscultation, normal A-P diameter, normal symmetry, normal respiratory excursion, no use of accessory muscles         Cardiac: regular rhythm;no murmurs, gallops or rubs detected occasional premature beats              pulses full and equal                                        GI:           Extremities and Muscular Skeletal:  no edema;no spinal abnormalities noted;normal muscle strength and tone              Neurological:  no gross motor deficits        Psych:  affect appropriate, oriented to time, person and place        CC  Referred Self, "       Service Date: 06/01/2022    HISTORY OF PRESENT ILLNESS:  Mariam is seeking me out as a second opinion and transfer of care.  She formerly followed through [facility].  Her daughter knows my wife and neighbor, Corrie Molina.    PAST MEDICAL HISTORY:  Mariam's past medical history is significant for hypertension, hyperlipidemia, hypothyroidism, Parkinson's disease and known coronary artery disease.    Her coronary history dates back to 01/2020, when she had 5 drug-eluting stents placed to her proximal and mid right coronary artery as well as ostial, proximal circumflex and mid circumflex.  In 09/2020, due to recurrent exertional symptoms, a coronary CT angiogram appeared to demonstrate severe in-stent restenosis in the left anterior descending artery.  Angiography demonstrated iFR-negative disease in the LAD and she underwent angioplasty to in-stent restenosis in her left circumflex coronary artery.  Her September angiogram was complicated by a shower of small strokes noted by MRI.  Her last angiogram also noted a second obtuse marginal that was totally occluded.  Her symptoms post-second angiogram included diplopia and right-sided facial droop.    Mariam returns to clinic at this time, stating that she thinks she is doing fairly well.  She walks a mile 4-5 times a week.  She gets this done under 25 minutes and this does not cause her any symptoms.  She states her blood pressure is checked twice a month and always is in the 125-130 range over 60s.  She perseverates over whether she has Parkinson's.  She has seen 3 different neurologists over the years, 2 have told her that she does not have it and 1 told that she does have it.  I told her I am a cardiologist and since she has 3 neurologists, I would let them decide whether she actually has Parkinson's or essential tremor.    She is on aspirin only.  She stopped her Plavix last September.  She notes no side effects or problems with her current medical  regimen, although has many questions about her medications.    ASSESSMENT/PLAN:  Mariam appears to be doing well from a cardiac standpoint without clinical evidence of ischemia.  Review of Care Everywhere shows she did have an echocardiogram performed in 10/2021, demonstrating an ejection fraction of 65%-70% without focal wall motion abnormalities.  She has a sclerotic mitral valve with mild mitral regurgitation.  Pulmonary pressure is estimated to be 29 mmHg plus her right atrial pressure.    She has no symptoms to suggest heart failure or significant arrhythmia.    Blood pressure was high today at 150/71.  Although given the fact that she is getting her blood pressure checked regularly and it is not normally high, I am not going to intensify her antihypertensive regimen.  We talked about arterial noncompliance.    I congratulated her on her exercise regimen and encouraged her to continue to do so.    Care Everywhere also reveals a fasting lipid profile from August.  Total cholesterol 133, HDL 59, LDL of 51, triglycerides of 114.  We talked about the individual parameters and what these mean.    We reviewed all of her medications.  I will continue them all as is.    Her other complaint is that of fatigue and tiredness and when I talked to her, she states she sleeps 8-9 hours a day.  She has daytime somnolence and feels that she needs to take a nap.   I told her that she most likely has a sleep disorder.  She does snore at nighttime.  I have offered her a sleep study.  She states she had one 10 years ago.  She just wanted to make sure it was not a side effect of medications or a sign of a heart problem.  At this time, she does not want to pursue a sleep study.    Almost an hour was spent with Mariam and her daughter today.  I will have her follow up with [facility] in 6 months.  I will see her back in a year.  If she should have any problems, I would be glad to see her sooner.    Thank you for allowing me to participate  in her care.    Jomar Zelaya MD, [facility]        D: 2022   T: 2022   MT: josé    Name:     SHAMEKA TURNER  MRN:      [id number]        Account:      [id number]   :      1935           Service Date: 2022       Document: [id number]      Thank you for allowing me to participate in the care of your patient.      Sincerely,     Jomar Zelaya MD     [facility]  cc:   Referred Self

## 2022-06-01 NOTE — PROGRESS NOTES
HPI and Plan:   See dictation    Today's clinic visit entailed:  Review of the result(s) of each unique test - EKG, echocardiogram, lab work, cath lab reports  The following tests were independently interpreted by me as noted in my documentation: EKG  Ordering of each unique test  Prescription drug management  45 minutes spent on the date of the encounter doing chart review, history and exam, documentation and further activities per the note  Provider  Link to MDM Help Grid     The level of medical decision making during this visit was of moderate complexity.      Orders Placed This Encounter   Procedures     Basic metabolic panel     Lipid Profile     ALT     [facility]     Follow-Up with Cardiology     EKG 12-lead complete w/read - Clinics (performed today)       Orders Placed This Encounter   Medications     levothyroxine (SYNTHROID/LEVOTHROID) 25 MCG tablet     Sig: TAKE 1 TABLET (25 MCG) BY MOUTH BEFORE BREAKFAST.     amLODIPine (NORVASC) 5 MG tablet     Sig: Take 5 mg by mouth daily     rosuvastatin (CRESTOR) 20 MG tablet     Sig: Take 20 mg by mouth At Bedtime     carbidopa-levodopa (SINEMET)  MG tablet     Sig: TAKE 1 TABLET BY MOUTH TWICE A DAY     metoprolol succinate ER (TOPROL XL) 25 MG 24 hr tablet     Sig: Take 1 tablet (25 mg) by mouth daily     Dispense:  90 tablet     Refill:  3       Medications Discontinued During This Encounter   Medication Reason     METOPROLOL TARTRATE PO          Encounter Diagnoses   Name Primary?     Coronary artery disease involving native coronary artery of native heart without angina pectoris Yes     Hyperlipidemia LDL goal <70      Nonrheumatic mitral valve regurgitation      Benign essential hypertension        CURRENT MEDICATIONS:  Current Outpatient Medications   Medication Sig Dispense Refill     amLODIPine (NORVASC) 5 MG tablet Take 5 mg by mouth daily       ASPIRIN ADULT LOW STRENGTH PO        carbidopa-levodopa (SINEMET)  MG tablet  TAKE 1 TABLET BY MOUTH TWICE A DAY       Citalopram Hydrobromide (CELEXA PO) Take 10 mg by mouth       levothyroxine (SYNTHROID/LEVOTHROID) 25 MCG tablet TAKE 1 TABLET (25 MCG) BY MOUTH BEFORE BREAKFAST.       metoprolol succinate ER (TOPROL XL) 25 MG 24 hr tablet Take 1 tablet (25 mg) by mouth daily 90 tablet 3     oxybutynin (DITROPAN-XL) 5 MG 24 hr tablet Take by mouth daily       rosuvastatin (CRESTOR) 20 MG tablet Take 20 mg by mouth At Bedtime         ALLERGIES     Allergies   Allergen Reactions     Atorvastatin Muscle Pain (Myalgia)     Evolocumab Other (See Comments)     Stiff neck, stiff jaw, runny nose       PAST MEDICAL HISTORY:  Past Medical History:   Diagnosis Date     Anemia      Carotid artery plaque      Coronary artery disease involving native coronary artery of native heart without angina pectoris     cardiac cath 9/2020 @ [facility]: MARCIE to circumflex; cath 1/2020 @ [facility]: MARCIE x2 to RCA and MARCIE x3 to circumflex     Depressive disorder      Gout      Hyperlipidemia LDL goal <70      Hypertension      Hypothyroidism      Mitral valve regurgitation      Spinal stenosis     lumbar ablation 3/2021 @ [facility]     Tremor        PAST SURGICAL HISTORY:  Past Surgical History:   Procedure Laterality Date     BREAST SURGERY      breast reduction     COLONOSCOPY N/A 12/8/2015    Procedure: COLONOSCOPY;  Surgeon: Anand Cameron MD;  Location:  GI     ENT SURGERY      tonsillectomy       FAMILY HISTORY:  No family history on file.    SOCIAL HISTORY:  Social History     Socioeconomic History     Marital status: Single     Spouse name: None     Number of children: None     Years of education: None     Highest education level: None   Tobacco Use     Smoking status: Never Smoker     Smokeless tobacco: Never Used   Substance and Sexual Activity     Alcohol use: Yes     Comment: occasional       Review of Systems:  Skin:  Negative       Eyes:  Negative      ENT:  Negative      Respiratory:  Negative shortness  "of breath;dyspnea on exertion     Cardiovascular:  Negative;palpitations;chest pain;edema;dizziness;lightheadedness Positive for;fatigue    Gastroenterology: Negative      Genitourinary:  Negative      Musculoskeletal:  Positive for neck pain    Neurologic:  Negative headaches    Psychiatric:  Negative      Heme/Lymph/Imm:  Positive for allergies    Endocrine:  Positive for thyroid disorder      Physical Exam:  Vitals: BP (!) 150/71 (BP Location: Left arm, Patient Position: Sitting)   Pulse 69   Ht 1.676 m (5' 6\")   Wt 62 kg (136 lb 11.2 oz)   BMI 22.06 kg/m      Constitutional:  cooperative, alert and oriented, well developed, well nourished, in no acute distress appears younger than stated age      Skin:  warm and dry to the touch, no apparent skin lesions or masses noted          Head:  normocephalic, no masses or lesions        Eyes:  pupils equal and round, conjunctivae and lids unremarkable, sclera white, no xanthalasma, EOMS intact, no nystagmus        Lymph:      ENT:  no pallor or cyanosis, dentition good        Neck:  carotid pulses are full and equal bilaterally;no carotid bruit        Respiratory:  normal breath sounds, clear to auscultation, normal A-P diameter, normal symmetry, normal respiratory excursion, no use of accessory muscles         Cardiac: regular rhythm;no murmurs, gallops or rubs detected occasional premature beats              pulses full and equal                                        GI:           Extremities and Muscular Skeletal:  no edema;no spinal abnormalities noted;normal muscle strength and tone              Neurological:  no gross motor deficits        Psych:  affect appropriate, oriented to time, person and place        CC  Referred Self, MD  No address on file              "

## 2022-06-21 DIAGNOSIS — I25.10 CORONARY ARTERY DISEASE INVOLVING NATIVE CORONARY ARTERY OF NATIVE HEART WITHOUT ANGINA PECTORIS: ICD-10-CM

## 2022-06-21 RX ORDER — METOPROLOL SUCCINATE 25 MG/1
25 TABLET, EXTENDED RELEASE ORAL DAILY
Qty: 90 TABLET | Refills: 3 | Status: SHIPPED | OUTPATIENT
Start: 2022-06-21 | End: 2023-06-09

## 2022-10-16 ENCOUNTER — HEALTH MAINTENANCE LETTER (OUTPATIENT)
Age: 87
End: 2022-10-16

## 2022-10-20 ENCOUNTER — TELEPHONE (OUTPATIENT)
Dept: CARDIOLOGY | Facility: CLINIC | Age: 87
End: 2022-10-20

## 2022-10-20 NOTE — TELEPHONE ENCOUNTER
[facility]    Phone Message    May a detailed message be left on voicemail: yes     Reason for Call: Other: Patient calling to ask if Claritin is okay for her to take given her heart condition. Please call her back to discuss.    Thank you!  [facility]    Action Taken: Other: Cardiology    Travel Screening: Not Applicable

## 2022-10-20 NOTE — TELEPHONE ENCOUNTER
Spoke with patient to review that OTC Claritin is safe with her heart medications. Reviewed to avoid NSAIDs and use Tylenol for pain control. She states she was told the same information from her pain medication provider.

## 2022-12-01 NOTE — PROGRESS NOTES
Primary Cardiologist: Dr. Zelaya    Reason for Visit: 6 month follow up    History of Present Illness:   Mariam is a pleasant 87 year old female with past medical history notable for HTN, HLD, CVA following cath in 2020, CAD (had 5 MARCIE placed to her proximal and mid RCA as well as ostial, proximal circumflex and mid circumflex;  In 09/2020, due to recurrent exertional symptoms, a coronary CT angiogram appeared to demonstrate severe ISR in the LAD;  repeat cor angio showed iFR-negative disease in the LAD and she underwent angioplasty to in-stent restenosis in her left circumflex coronary artery; her 9/2021 angiogram was complicated by a shower of small strokes noted by MRI.  Her last angiogram also noted a second obtuse marginal that was totally occluded; medical tx was recommended), hypothyroidism, and possible Parkinson's disease.     Mariam is doing well. She continues to have daytime fatigue and takes frequent naps. She is not sure if she snores at night but she sleeps like a log. Her BP is high here but it is normally under much better control at home. She thinks she has white coat hypertension. She has no CP, palpitations, peripheral edema, PND, or SOB.     Assessment and Plan:  Mariam is a pleasant 87 year old female with past medical history notable for HTN, HLD, CVA following cath in 2020, extensive CAD(outline in detail in HPI), hypothyroidism, and possible Parkinson's disease.     Mariam is doing well from a cardiac standpoint. I have offered to refer her for sleep study but she would like to defer this at this time. Her recent lab work which included CBC and TSH were unremarkable. Her BP is at goal at home thus we will not change her antihypertensives today. She will return to our clinic in 6 months.      This note was completed in part using Dragon voice recognition software. Although reviewed after completion, some word and grammatical errors may occur.    Orders this Visit:  No orders of the defined  types were placed in this encounter.    No orders of the defined types were placed in this encounter.    There are no discontinued medications.      No diagnosis found.    CURRENT MEDICATIONS:  Current Outpatient Medications   Medication Sig Dispense Refill     amLODIPine (NORVASC) 5 MG tablet Take 5 mg by mouth daily       ASPIRIN ADULT LOW STRENGTH PO        carbidopa-levodopa (SINEMET)  MG tablet TAKE 1 TABLET BY MOUTH TWICE A DAY       Citalopram Hydrobromide (CELEXA PO) Take 10 mg by mouth       levothyroxine (SYNTHROID/LEVOTHROID) 25 MCG tablet TAKE 1 TABLET (25 MCG) BY MOUTH BEFORE BREAKFAST.       metoprolol succinate ER (TOPROL XL) 25 MG 24 hr tablet Take 1 tablet (25 mg) by mouth daily 90 tablet 3     oxybutynin (DITROPAN-XL) 5 MG 24 hr tablet Take by mouth daily       rosuvastatin (CRESTOR) 20 MG tablet Take 20 mg by mouth At Bedtime         ALLERGIES     Allergies   Allergen Reactions     Atorvastatin Muscle Pain (Myalgia)     Evolocumab Other (See Comments)     Stiff neck, stiff jaw, runny nose       PAST MEDICAL HISTORY:  Past Medical History:   Diagnosis Date     Anemia      Carotid artery plaque      Coronary artery disease involving native coronary artery of native heart without angina pectoris     cardiac cath 9/2020 @ [facility]: MARCIE to circumflex; cath 1/2020 @ [facility]: MARCIE x2 to RCA and MARCIE x3 to circumflex     Depressive disorder      Gout      Hyperlipidemia LDL goal <70      Hypertension      Hypothyroidism      Mitral valve regurgitation      Spinal stenosis     lumbar ablation 3/2021 @ [facility]     Tremor        PAST SURGICAL HISTORY:  Past Surgical History:   Procedure Laterality Date     BREAST SURGERY      breast reduction     COLONOSCOPY N/A 12/8/2015    Procedure: COLONOSCOPY;  Surgeon: Anand Cameron MD;  Location:  GI     ENT SURGERY      tonsillectomy       FAMILY HISTORY:  No family history on file.    SOCIAL HISTORY:  Social History     Socioeconomic History     Marital  status: Single   Tobacco Use     Smoking status: Never     Smokeless tobacco: Never   Substance and Sexual Activity     Alcohol use: Yes     Comment: occasional       Review of Systems:  Skin:        Eyes:       ENT:       Respiratory:       Cardiovascular:       Gastroenterology:      Genitourinary:       Musculoskeletal:       Neurologic:       Psychiatric:       Heme/Lymph/Imm:       Endocrine:         Physical Exam:  Vitals: There were no vitals taken for this visit.     GEN:  NAD  NECK: No JVD  C/V: Fine crackles suggestive of fibrotic changes. otw CTA keysha.   RESP: Clear to auscultation bilaterally.  GI: Abdomen soft, nontender, nondistended.   EXTREM: No pitting LE edema.   NEURO: Alert and oriented, cooperative. No obvious focal deficits.   PSYCH: Normal affect.  SKIN: Warm and dry.       Recent Lab Results:  LIPID RESULTS:  Lab Results   Component Value Date    CHOL 188 01/08/2020    HDL 49 01/08/2020     01/08/2020    TRIG 110 01/08/2020       LIVER ENZYME RESULTS:  Lab Results   Component Value Date    AST 24 12/30/2019    ALT 17 12/30/2019       CBC RESULTS:  No results found for: WBC, RBC, HGB, HCT, MCV, MCH, MCHC, RDW, PLT    BMP RESULTS:  Lab Results   Component Value Date    POTASSIUM 4.3 12/30/2019    GLC 98 12/30/2019    CR 1.09 12/30/2019    GFRESTIMATED 48 (L) 12/30/2019    GFRESTBLACK 58 (L) 12/30/2019        A1C RESULTS:  No results found for: A1C    INR RESULTS:  No results found for: INR        Desiree Pool PA-C  December 1, 2022

## 2022-12-02 ENCOUNTER — OFFICE VISIT (OUTPATIENT)
Dept: CARDIOLOGY | Facility: CLINIC | Age: 87
End: 2022-12-02
Payer: COMMERCIAL

## 2022-12-02 VITALS
WEIGHT: 134.1 LBS | SYSTOLIC BLOOD PRESSURE: 147 MMHG | HEART RATE: 57 BPM | OXYGEN SATURATION: 97 % | HEIGHT: 66 IN | DIASTOLIC BLOOD PRESSURE: 83 MMHG | BODY MASS INDEX: 21.55 KG/M2

## 2022-12-02 DIAGNOSIS — I10 BENIGN ESSENTIAL HYPERTENSION: ICD-10-CM

## 2022-12-02 DIAGNOSIS — E78.5 HYPERLIPIDEMIA LDL GOAL <70: Primary | ICD-10-CM

## 2022-12-02 DIAGNOSIS — I25.10 CORONARY ARTERY DISEASE INVOLVING NATIVE CORONARY ARTERY OF NATIVE HEART WITHOUT ANGINA PECTORIS: ICD-10-CM

## 2022-12-02 PROCEDURE — 99214 OFFICE O/P EST MOD 30 MIN: CPT | Performed by: PHYSICIAN ASSISTANT

## 2022-12-02 NOTE — LETTER
12/2/2022    Edouard Wolf MD  [employer] [address]  [facility] [address]    RE: Mariam MERCADO Doe       Dear Colleague,     I had the pleasure of seeing Mariam Azar in the [facility].  Primary Cardiologist: Dr. Zelaya    Reason for Visit: 6 month follow up    History of Present Illness:   Mariam is a pleasant 87 year old female with past medical history notable for HTN, HLD, CVA following cath in 2020, CAD (had 5 MARCIE placed to her proximal and mid RCA as well as ostial, proximal circumflex and mid circumflex;  In 09/2020, due to recurrent exertional symptoms, a coronary CT angiogram appeared to demonstrate severe ISR in the LAD;  repeat cor angio showed iFR-negative disease in the LAD and she underwent angioplasty to in-stent restenosis in her left circumflex coronary artery; her 9/2021 angiogram was complicated by a shower of small strokes noted by MRI.  Her last angiogram also noted a second obtuse marginal that was totally occluded; medical tx was recommended), hypothyroidism, and possible Parkinson's disease.     Mariam is doing well. She continues to have daytime fatigue and takes frequent naps. She is not sure if she snores at night but she sleeps like a log. Her BP is high here but it is normally under much better control at home. She thinks she has white coat hypertension. She has no CP, palpitations, peripheral edema, PND, or SOB.     Assessment and Plan:  Mariam is a pleasant 87 year old female with past medical history notable for HTN, HLD, CVA following cath in 2020, extensive CAD(outline in detail in HPI), hypothyroidism, and possible Parkinson's disease.     Mariam is doing well from a cardiac standpoint. I have offered to refer her for sleep study but she would like to defer this at this time. Her recent lab work which included CBC and TSH were unremarkable. Her BP is at goal at home thus we will not change her antihypertensives today. She will return to  our clinic in 6 months.      This note was completed in part using Dragon voice recognition software. Although reviewed after completion, some word and grammatical errors may occur.    Orders this Visit:  No orders of the defined types were placed in this encounter.    No orders of the defined types were placed in this encounter.    There are no discontinued medications.      No diagnosis found.    CURRENT MEDICATIONS:  Current Outpatient Medications   Medication Sig Dispense Refill     amLODIPine (NORVASC) 5 MG tablet Take 5 mg by mouth daily       ASPIRIN ADULT LOW STRENGTH PO        carbidopa-levodopa (SINEMET)  MG tablet TAKE 1 TABLET BY MOUTH TWICE A DAY       Citalopram Hydrobromide (CELEXA PO) Take 10 mg by mouth       levothyroxine (SYNTHROID/LEVOTHROID) 25 MCG tablet TAKE 1 TABLET (25 MCG) BY MOUTH BEFORE BREAKFAST.       metoprolol succinate ER (TOPROL XL) 25 MG 24 hr tablet Take 1 tablet (25 mg) by mouth daily 90 tablet 3     oxybutynin (DITROPAN-XL) 5 MG 24 hr tablet Take by mouth daily       rosuvastatin (CRESTOR) 20 MG tablet Take 20 mg by mouth At Bedtime         ALLERGIES     Allergies   Allergen Reactions     Atorvastatin Muscle Pain (Myalgia)     Evolocumab Other (See Comments)     Stiff neck, stiff jaw, runny nose       PAST MEDICAL HISTORY:  Past Medical History:   Diagnosis Date     Anemia      Carotid artery plaque      Coronary artery disease involving native coronary artery of native heart without angina pectoris     cardiac cath 9/2020 @ [facility]: MARCIE to circumflex; cath 1/2020 @ [facility]: MARCIE x2 to RCA and MARCIE x3 to circumflex     Depressive disorder      Gout      Hyperlipidemia LDL goal <70      Hypertension      Hypothyroidism      Mitral valve regurgitation      Spinal stenosis     lumbar ablation 3/2021 @ [facility]     Tremor        PAST SURGICAL HISTORY:  Past Surgical History:   Procedure Laterality Date     BREAST SURGERY      breast reduction     COLONOSCOPY N/A 12/8/2015     Procedure: COLONOSCOPY;  Surgeon: Anand Cameron MD;  Location:  GI     ENT SURGERY      tonsillectomy       FAMILY HISTORY:  No family history on file.    SOCIAL HISTORY:  Social History     Socioeconomic History     Marital status: Single   Tobacco Use     Smoking status: Never     Smokeless tobacco: Never   Substance and Sexual Activity     Alcohol use: Yes     Comment: occasional       Review of Systems:  Skin:        Eyes:       ENT:       Respiratory:       Cardiovascular:       Gastroenterology:      Genitourinary:       Musculoskeletal:       Neurologic:       Psychiatric:       Heme/Lymph/Imm:       Endocrine:         Physical Exam:  Vitals: There were no vitals taken for this visit.     GEN:  NAD  NECK: No JVD  C/V: Fine crackles suggestive of fibrotic changes. otw CTA keysha.   RESP: Clear to auscultation bilaterally.  GI: Abdomen soft, nontender, nondistended.   EXTREM: No pitting LE edema.   NEURO: Alert and oriented, cooperative. No obvious focal deficits.   PSYCH: Normal affect.  SKIN: Warm and dry.       Recent Lab Results:  LIPID RESULTS:  Lab Results   Component Value Date    CHOL 188 01/08/2020    HDL 49 01/08/2020     01/08/2020    TRIG 110 01/08/2020       LIVER ENZYME RESULTS:  Lab Results   Component Value Date    AST 24 12/30/2019    ALT 17 12/30/2019       CBC RESULTS:  No results found for: WBC, RBC, HGB, HCT, MCV, MCH, MCHC, RDW, PLT    BMP RESULTS:  Lab Results   Component Value Date    POTASSIUM 4.3 12/30/2019    GLC 98 12/30/2019    CR 1.09 12/30/2019    GFRESTIMATED 48 (L) 12/30/2019    GFRESTBLACK 58 (L) 12/30/2019        A1C RESULTS:  No results found for: A1C    INR RESULTS:  No results found for: INR        Desiree Pool PA-C  December 1, 2022       Thank you for allowing me to participate in the care of your patient.      Sincerely,     Desiree Pool PA-C     [facility]  cc:   Jomar JONES  MD Nathalie  [address]  [place] [address]

## 2023-03-28 ENCOUNTER — NURSE TRIAGE (OUTPATIENT)
Dept: CARDIOLOGY | Facility: CLINIC | Age: 88
End: 2023-03-28
Payer: COMMERCIAL

## 2023-03-28 ENCOUNTER — TELEPHONE (OUTPATIENT)
Dept: CARDIOLOGY | Facility: CLINIC | Age: 88
End: 2023-03-28
Payer: COMMERCIAL

## 2023-03-28 NOTE — TELEPHONE ENCOUNTER
Message from triage team:  Patient was transferred from [facility] to speak to Triage. Patient C/O left foot and ankle swelling occurring for about a week. Not progressing. She says the foot and ankle look red, skin is tight and shiny. No pain noted and the area is not hot. No other symptoms to note and breathing is not affected. She does not state any other area of her body is swollen/edematous. She does state she is tired all the time. The right foot is a little swollen but not like the left foot. Patient has H/O CAD, stent placement, CVA. Patient has an appointment with Dr. Zelaya scheduled 5/31/23. She last saw Desiree Pool. Routing to her care team for follow-up.    1040 called patient to discuss her edema. Patient states this has not been a problem before but she started noticing a swelling in her left foot and ankle about 2 weeks ago. She can see it in her right side also, but the left is pronounced. The skin gets very tight, shiny and red. She has swelling on that side about 1/2 way up her calf. She has no weight gain and no new SOB issues.    Discussed with patient to try leg elevation for 30 minutes twice a day. She does not have compression stockings. She has never had to take diuretics.    Patient notes she still struggles with fatigue. She sleeps 10-12 hours a night and is still tired in the morning. She states she had a sleep study 10 years ago that did not show apnea, but will consider the recommendation from December to try that again.    She notes that she is also cold all the time. She had her thyroid studies checked in September and they were normal. Patient is on toprol XL 25mg daily.    Patient to see Dr. Zelaya on 5/31/2023 with labs.    Will message [facility] Des[facility]e Pool to review

## 2023-03-28 NOTE — TELEPHONE ENCOUNTER
Reply from [facility] Des[facility]e Pool:  All of these appear to sound primary care related questions/concerns. In regard to unilateral foot/ankle swelling redness she should go to urgent care for ultrasound. The other issues can be discussed with her PCP.     Thanks,   Desiree     Called patient to review reply from [facility] Des[facility] Pool. Patient is relieved that she is unlikely to have heart failure. She will go to her local Urgent Care center to have a leg US and then she sees her PCP in a couple of weeks and will follow up. She appreciated the rapid reply.

## 2023-03-28 NOTE — TELEPHONE ENCOUNTER
"Patient was transferred from [facility] to speak to Triage. Patient C/O left foot and ankle swelling occurring for about a week. Not progressing. She says the foot and ankle look red, skin is tight and shiny. No pain noted and the area is not hot. No other symptoms to note and breathing is not affected. She does not state any other area of her body is swollen/edematous. She does state she is tired all the time. The right foot is a little swollen but not like the left foot. Patient has H/O CAD, stent placement, CVA. Patient has an appointment with Dr. Zelaya scheduled 5/31/23. She last saw Desiree Pool. Routing to her care team for follow-up.    1. LOCATION: \"Which ankle is swollen?\" \"Where is the swelling?\" Left foot and ankle.  2. ONSET: \"When did the swelling start?\" For a week.  3. SWELLING: \"How bad is the swelling?\" Or, \"How large is it?\" (e.g., mild, moderate, severe; size of localized swelling) Patient states the left foot and especially the ankle.   - NONE: No joint swelling.  - LOCALIZED: Localized; small area of puffy or swollen skin (e.g., insect bite, skin irritation).  - MILD: Joint looks or feels mildly swollen or puffy.  - MODERATE: Swollen; interferes with normal activities (e.g., work or school); decreased range of movement; may be limping.  - SEVERE: Very swollen; can't move swollen joint at all; limping a lot or unable to walk.  4. PAIN: \"Is there any pain?\" If Yes, ask: \"How bad is it?\" (Scale 1-10; or mild, moderate, severe) None.  - NONE (0): no pain.  - MILD (1-3): doesn't interfere with normal activities.   - MODERATE (4-7): interferes with normal activities (e.g., work or school) or awakens from sleep, limping.   - SEVERE (8-10): excruciating pain, unable to do any normal activities, unable to walk.   5. CAUSE: \"What do you think caused the ankle swelling?\" Cardiac-related.  6. OTHER SYMPTOMS: \"Do you have any other symptoms?\" (e.g., fever, chest pain, difficulty breathing, calf pain) " Patient says she is tired all the time. No other symptoms.

## 2023-04-05 ENCOUNTER — TRANSFERRED RECORDS (OUTPATIENT)
Dept: HEALTH INFORMATION MANAGEMENT | Facility: CLINIC | Age: 88
End: 2023-04-05

## 2023-04-05 LAB
ALT SERPL-CCNC: 6 IU/L (ref 10–35)
AST SERPL-CCNC: 29 IU/L (ref 10–35)
CHOLESTEROL (EXTERNAL): 142 MG/DL (ref 100–199)
CREATININE (EXTERNAL): 0.92 MG/DL (ref 0.5–0.9)
GFR ESTIMATED (EXTERNAL): 61 ML/MIN/1.73M2
GLUCOSE (EXTERNAL): 95 MG/DL (ref 70–99)
HDLC SERPL-MCNC: 66 MG/DL
LDL CHOLESTEROL CALCULATED (EXTERNAL): 61 MG/DL (ref 0–199)
POTASSIUM (EXTERNAL): 4.2 MMOL/L (ref 3.5–5.1)
TRIGLYCERIDES (EXTERNAL): 74 MG/DL (ref 0–149)
TSH SERPL-ACNC: 2.28 UIU/ML (ref 0.45–4.5)

## 2023-05-31 ENCOUNTER — OFFICE VISIT (OUTPATIENT)
Dept: CARDIOLOGY | Facility: CLINIC | Age: 88
End: 2023-05-31
Payer: COMMERCIAL

## 2023-05-31 VITALS
SYSTOLIC BLOOD PRESSURE: 158 MMHG | WEIGHT: 133 LBS | BODY MASS INDEX: 21.38 KG/M2 | HEART RATE: 60 BPM | OXYGEN SATURATION: 97 % | DIASTOLIC BLOOD PRESSURE: 80 MMHG | HEIGHT: 66 IN

## 2023-05-31 DIAGNOSIS — I25.10 CORONARY ARTERY DISEASE INVOLVING NATIVE CORONARY ARTERY OF NATIVE HEART WITHOUT ANGINA PECTORIS: Primary | ICD-10-CM

## 2023-05-31 DIAGNOSIS — R60.0 PERIPHERAL EDEMA: ICD-10-CM

## 2023-05-31 DIAGNOSIS — E78.5 HYPERLIPIDEMIA LDL GOAL <70: ICD-10-CM

## 2023-05-31 DIAGNOSIS — I10 BENIGN ESSENTIAL HYPERTENSION: ICD-10-CM

## 2023-05-31 PROCEDURE — 99214 OFFICE O/P EST MOD 30 MIN: CPT | Performed by: INTERNAL MEDICINE

## 2023-05-31 RX ORDER — HYDROCHLOROTHIAZIDE 12.5 MG/1
12.5 TABLET ORAL DAILY
Qty: 90 TABLET | Refills: 4 | Status: SHIPPED | OUTPATIENT
Start: 2023-05-31 | End: 2024-06-05

## 2023-05-31 NOTE — LETTER
5/31/2023    Edouard Wolf MD  [address] [place] [address]    RE: Mariam MERCADO Doe       Dear Colleague,     I had the pleasure of seeing Mariam Azar in the [facility].  HPI and Plan:    Mariam is seeking me out as a second opinion and transfer of care.  She formerly followed through [facility].  Her daughter knows my wife and neighbor, Corrie Molina.      Mariam's past medical history is significant for hypertension, hyperlipidemia, hypothyroidism, Parkinson's disease and coronary artery disease.     Her coronary history dates back to 01/2020, when she had 5 drug-eluting stents placed to her proximal and mid right coronary artery as well as ostial, proximal and mid circumflex.  In 09/2020, due to recurrent exertional symptoms, a coronary CT angiogram appeared to demonstrate severe in-stent restenosis in the left anterior descending artery.  Angiography demonstrated iFR-negative disease in the LAD and she underwent angioplasty to in-stent restenosis in her left circumflex coronary artery.  Her  angiogram was complicated by a shower of small strokes noted by MRI.  Her last angiogram also noted a second obtuse marginal that was totally occluded.  Her symptoms included diplopia and right-sided facial droop.     Mariam returns to clinic at this time, stating that she thinks she is doing well.  She is not walking as regularly as she did a year ago where she would do a mile 4-5 times a week.  Although she states she gets in plenty of steps every day.   She states her blood pressure is checked once a month and always is in the 125-130 range over 60s.        She notes no side effects or problems with her current medical regimen, although has many questions about her medications.  She does have a fair amount of ecchymoses on her legs.     ASSESSMENT/PLAN:  Mariam appears to be doing well from a cardiac standpoint without clinical evidence of ischemia.  Review of Care  Everywhere shows she did have an echocardiogram performed in 10/2021, demonstrating an ejection fraction of 65%-70% without focal wall motion abnormalities.  She has a sclerotic mitral valve with mild mitral regurgitation.  Pulmonary pressure is estimated to be 29 mmHg plus her right atrial pressure.     She has no symptoms to suggest heart failure or significant arrhythmia.     Blood pressure was high today at 150/71 as usual.    We talked about the Sprint trial and even her blood pressures recorded at her [facility] center are not ideal.  I will try adding 12.5 mg of hydrochlorothiazide to her regiment.  I have her follow-up with my [facility] in 2 weeks with a BMP and blood pressure recheck.    I congratulated her on her exercise regimen and encouraged her to continue to do so.     Most recent fasting lipid profile in care everywhere shows a total cholesterol of 142 and HDL of 66 LDL of 61 and triglycerides of 74.  We talked a bit about the LDL hypothesis and whether we should try to drive her LDL lower as recent guidelines now recommends below 55.  She does not want to intensify her cholesterol management at this time.     We reviewed all of her medications.  I will continue them all as is.     Thank you for allowing me to participate in her care.     Jomar Zelaya MD, [facility]          Today's clinic visit entailed:  Review of the result(s) of each unique test - Lab work  Ordering of each unique test  Prescription drug management  35 minutes spent by me on the date of the encounter doing chart review, history and exam, documentation and further activities per the note  Provider  Link to [facility] Help Grid     The level of medical decision making during this visit was of moderate complexity.      Orders Placed This Encounter   Procedures    Basic metabolic panel    TSH with free T4 reflex    Basic metabolic panel    Lipid Profile    ALT    [facility]    Follow-Up with Cardiology       Orders Placed This  Encounter   Medications    hydrochlorothiazide (HYDRODIURIL) 12.5 MG tablet     Sig: Take 1 tablet (12.5 mg) by mouth daily     Dispense:  90 tablet     Refill:  4       There are no discontinued medications.      Encounter Diagnoses   Name Primary?    Coronary artery disease involving native coronary artery of native heart without angina pectoris Yes    Benign essential hypertension     Hyperlipidemia LDL goal <70     Peripheral edema        CURRENT MEDICATIONS:  Current Outpatient Medications   Medication Sig Dispense Refill    amLODIPine (NORVASC) 5 MG tablet Take 5 mg by mouth daily      ASPIRIN ADULT LOW STRENGTH PO Take 81 mg by mouth daily      carbidopa-levodopa (SINEMET)  MG tablet TAKE 1 TABLET BY MOUTH TWICE A DAY      Citalopram Hydrobromide (CELEXA PO) Take 10 mg by mouth      hydrochlorothiazide (HYDRODIURIL) 12.5 MG tablet Take 1 tablet (12.5 mg) by mouth daily 90 tablet 4    levothyroxine (SYNTHROID/LEVOTHROID) 25 MCG tablet TAKE 1 TABLET (25 MCG) BY MOUTH BEFORE BREAKFAST.      metoprolol succinate ER (TOPROL XL) 25 MG 24 hr tablet Take 1 tablet (25 mg) by mouth daily 90 tablet 3    oxybutynin (DITROPAN-XL) 5 MG 24 hr tablet Take by mouth daily      rosuvastatin (CRESTOR) 20 MG tablet Take 20 mg by mouth At Bedtime         ALLERGIES     Allergies   Allergen Reactions    Atorvastatin Muscle Pain (Myalgia)    Evolocumab Other (See Comments)     Stiff neck, stiff jaw, runny nose       PAST MEDICAL HISTORY:  Past Medical History:   Diagnosis Date    Anemia     Carotid artery plaque     Coronary artery disease involving native coronary artery of native heart without angina pectoris     cardiac cath 9/2020 @ [facility]: MARCIE to circumflex; cath 1/2020 @ [facility]: MARCIE x2 to RCA and MARCIE x3 to circumflex    Depressive disorder     Gout     Hyperlipidemia LDL goal <70     Hypertension     Hypothyroidism     Mitral valve regurgitation     Spinal stenosis     lumbar ablation 3/2021 @ [facility]    Tremor        PAST  "SURGICAL HISTORY:  Past Surgical History:   Procedure Laterality Date    BREAST SURGERY      breast reduction    COLONOSCOPY N/A 12/8/2015    Procedure: COLONOSCOPY;  Surgeon: Anand Cameron MD;  Location:  GI    ENT SURGERY      tonsillectomy       FAMILY HISTORY:  History reviewed. No pertinent family history.    SOCIAL HISTORY:  Social History     Socioeconomic History    Marital status: Single     Spouse name: None    Number of children: None    Years of education: None    Highest education level: None   Tobacco Use    Smoking status: Never    Smokeless tobacco: Never   Substance and Sexual Activity    Alcohol use: Not Currently     Comment: occasional       Review of Systems:  Skin:  Negative       Eyes:  Negative      ENT:  Negative      Respiratory:  Negative       Cardiovascular:  Negative for;palpitations;chest pain;dizziness;lightheadedness Positive for;edema;fatigue occasional fatigue and slight swelling around feet and ankles  Gastroenterology: Negative      Genitourinary:  Negative      Musculoskeletal:  Positive for arthritis    Neurologic:  Negative      Psychiatric:  Negative      Heme/Lymph/Imm:  Negative      Endocrine:  Positive for thyroid disorder      Physical Exam:  Vitals: BP (!) 158/80   Pulse 60   Ht 1.676 m (5' 6\")   Wt 60.3 kg (133 lb)   SpO2 97%   BMI 21.47 kg/m      Constitutional:  cooperative, alert and oriented, well developed, well nourished, in no acute distress appears younger than stated age Tan    Skin:  warm and dry to the touch, no apparent skin lesions or masses noted          Head:  normocephalic, no masses or lesions        Eyes:  pupils equal and round, conjunctivae and lids unremarkable, sclera white, no xanthalasma, EOMS intact, no nystagmus        Lymph:      ENT:  no pallor or cyanosis, dentition good        Neck:  carotid pulses are full and equal bilaterally;no carotid bruit        Respiratory:  normal breath sounds, clear to auscultation, normal A-P " diameter, normal symmetry, normal respiratory excursion, no use of accessory muscles         Cardiac: regular rhythm;no murmurs, gallops or rubs detected occasional premature beats              pulses full and equal                                        GI:           Extremities and Muscular Skeletal:  no edema;no spinal abnormalities noted;normal muscle strength and tone              Neurological:  no gross motor deficits        Psych:  affect appropriate, oriented to time, person and place        CC  Jomar Zelaya MD  [address]  [place] [address]                  Thank you for allowing me to participate in the care of your patient.      Sincerely,     Jomar Zelaya MD     [facility]

## 2023-05-31 NOTE — PROGRESS NOTES
HPI and Plan:    Mariam is seeking me out as a second opinion and transfer of care.  She formerly followed through [facility].  Her daughter knows my wife and neighbor, Corrie Molina.      Mariam's past medical history is significant for hypertension, hyperlipidemia, hypothyroidism, Parkinson's disease and coronary artery disease.     Her coronary history dates back to 01/2020, when she had 5 drug-eluting stents placed to her proximal and mid right coronary artery as well as ostial, proximal and mid circumflex.  In 09/2020, due to recurrent exertional symptoms, a coronary CT angiogram appeared to demonstrate severe in-stent restenosis in the left anterior descending artery.  Angiography demonstrated iFR-negative disease in the LAD and she underwent angioplasty to in-stent restenosis in her left circumflex coronary artery.  Her  angiogram was complicated by a shower of small strokes noted by MRI.  Her last angiogram also noted a second obtuse marginal that was totally occluded.  Her symptoms included diplopia and right-sided facial droop.     Mariam returns to clinic at this time, stating that she thinks she is doing well.  She is not walking as regularly as she did a year ago where she would do a mile 4-5 times a week.  Although she states she gets in plenty of steps every day.   She states her blood pressure is checked once a month and always is in the 125-130 range over 60s.        She notes no side effects or problems with her current medical regimen, although has many questions about her medications.  She does have a fair amount of ecchymoses on her legs.     ASSESSMENT/PLAN:  Mariam appears to be doing well from a cardiac standpoint without clinical evidence of ischemia.  Review of Care Everywhere shows she did have an echocardiogram performed in 10/2021, demonstrating an ejection fraction of 65%-70% without focal wall motion abnormalities.  She has a sclerotic mitral valve with mild mitral  regurgitation.  Pulmonary pressure is estimated to be 29 mmHg plus her right atrial pressure.     She has no symptoms to suggest heart failure or significant arrhythmia.     Blood pressure was high today at 150/71 as usual.    We talked about the Sprint trial and even her blood pressures recorded at her [facility] center are not ideal.  I will try adding 12.5 mg of hydrochlorothiazide to her regiment.  [facility] with [facility] BMP and blood pressure recheck.    I congratulated her on her exercise regimen and encouraged her to continue to do so.     Most recent fasting lipid profile in care everywhere shows a total cholesterol of 142 and HDL of 66 LDL of 61 and triglycerides of 74.  We talked a bit about the LDL hypothesis and whether we should try to drive her LDL lower as recent guidelines now recommends below 55.  She does not want to intensify her cholesterol management at this time.     We reviewed all of her medications.  I will continue them all as is.     Thank you for allowing me to participate in her care.     Jomar Zelaya MD, [facility]          Today's clinic visit entailed:  Review of the result(s) of each unique test - Lab work  Ordering of each unique test  Prescription drug management  35 minutes spent by me on the date of the encounter doing chart review, history and exam, documentation and further activities per the note  Provider  Link to [facility] Help Grid     The level of medical decision making during this visit was of moderate complexity.      Orders Placed This Encounter   Procedures     Basic metabolic panel     TSH with free T4 reflex     Basic metabolic panel     Lipid Profile     ALT     [facility]     Follow-Up with Cardiology       Orders Placed This Encounter   Medications     hydrochlorothiazide (HYDRODIURIL) 12.5 MG tablet     Sig: Take 1 tablet (12.5 mg) by mouth daily     Dispense:  90 tablet     Refill:  4       There are no discontinued  medications.      Encounter Diagnoses   Name Primary?     Coronary artery disease involving native coronary artery of native heart without angina pectoris Yes     Benign essential hypertension      Hyperlipidemia LDL goal <70      Peripheral edema        CURRENT MEDICATIONS:  Current Outpatient Medications   Medication Sig Dispense Refill     amLODIPine (NORVASC) 5 MG tablet Take 5 mg by mouth daily       ASPIRIN ADULT LOW STRENGTH PO Take 81 mg by mouth daily       carbidopa-levodopa (SINEMET)  MG tablet TAKE 1 TABLET BY MOUTH TWICE A DAY       Citalopram Hydrobromide (CELEXA PO) Take 10 mg by mouth       hydrochlorothiazide (HYDRODIURIL) 12.5 MG tablet Take 1 tablet (12.5 mg) by mouth daily 90 tablet 4     levothyroxine (SYNTHROID/LEVOTHROID) 25 MCG tablet TAKE 1 TABLET (25 MCG) BY MOUTH BEFORE BREAKFAST.       metoprolol succinate ER (TOPROL XL) 25 MG 24 hr tablet Take 1 tablet (25 mg) by mouth daily 90 tablet 3     oxybutynin (DITROPAN-XL) 5 MG 24 hr tablet Take by mouth daily       rosuvastatin (CRESTOR) 20 MG tablet Take 20 mg by mouth At Bedtime         ALLERGIES     Allergies   Allergen Reactions     Atorvastatin Muscle Pain (Myalgia)     Evolocumab Other (See Comments)     Stiff neck, stiff jaw, runny nose       PAST MEDICAL HISTORY:  Past Medical History:   Diagnosis Date     Anemia      Carotid artery plaque      Coronary artery disease involving native coronary artery of native heart without angina pectoris     cardiac cath 9/2020 @ [facility]: MARCIE to circumflex; cath 1/2020 @ [facility]: MARCIE x2 to RCA and MARCIE x3 to circumflex     Depressive disorder      Gout      Hyperlipidemia LDL goal <70      Hypertension      Hypothyroidism      Mitral valve regurgitation      Spinal stenosis     lumbar ablation 3/2021 @ [facility]     Tremor        PAST SURGICAL HISTORY:  Past Surgical History:   Procedure Laterality Date     BREAST SURGERY      breast reduction     COLONOSCOPY N/A 12/8/2015    Procedure: COLONOSCOPY;   "Surgeon: Anand Cameron MD;  Location:  GI     ENT SURGERY      tonsillectomy       FAMILY HISTORY:  History reviewed. No pertinent family history.    SOCIAL HISTORY:  Social History     Socioeconomic History     Marital status: Single     Spouse name: None     Number of children: None     Years of education: None     Highest education level: None   Tobacco Use     Smoking status: Never     Smokeless tobacco: Never   Substance and Sexual Activity     Alcohol use: Not Currently     Comment: occasional       Review of Systems:  Skin:  Negative       Eyes:  Negative      ENT:  Negative      Respiratory:  Negative       Cardiovascular:  Negative for;palpitations;chest pain;dizziness;lightheadedness Positive for;edema;fatigue occasional fatigue and slight swelling around feet and ankles  Gastroenterology: Negative      Genitourinary:  Negative      Musculoskeletal:  Positive for arthritis    Neurologic:  Negative      Psychiatric:  Negative      Heme/Lymph/Imm:  Negative      Endocrine:  Positive for thyroid disorder      Physical Exam:  Vitals: BP (!) 158/80   Pulse 60   Ht 1.676 m (5' 6\")   Wt 60.3 kg (133 lb)   SpO2 97%   BMI 21.47 kg/m      Constitutional:  cooperative, alert and oriented, well developed, well nourished, in no acute distress appears younger than stated age Tan    Skin:  warm and dry to the touch, no apparent skin lesions or masses noted          Head:  normocephalic, no masses or lesions        Eyes:  pupils equal and round, conjunctivae and lids unremarkable, sclera white, no xanthalasma, EOMS intact, no nystagmus        Lymph:      ENT:  no pallor or cyanosis, dentition good        Neck:  carotid pulses are full and equal bilaterally;no carotid bruit        Respiratory:  normal breath sounds, clear to auscultation, normal A-P diameter, normal symmetry, normal respiratory excursion, no use of accessory muscles         Cardiac: regular rhythm;no murmurs, gallops or rubs detected " occasional premature beats              pulses full and equal                                        GI:           Extremities and Muscular Skeletal:  no edema;no spinal abnormalities noted;normal muscle strength and tone              Neurological:  no gross motor deficits        Psych:  affect appropriate, oriented to time, person and place        CC  Jomar Zelaya MD  [id number]  [place] [id number]

## 2023-06-09 DIAGNOSIS — I25.10 CORONARY ARTERY DISEASE INVOLVING NATIVE CORONARY ARTERY OF NATIVE HEART WITHOUT ANGINA PECTORIS: ICD-10-CM

## 2023-06-09 RX ORDER — METOPROLOL SUCCINATE 25 MG/1
25 TABLET, EXTENDED RELEASE ORAL DAILY
Qty: 90 TABLET | Refills: 3 | Status: SHIPPED | OUTPATIENT
Start: 2023-06-09

## 2023-07-13 ENCOUNTER — OFFICE VISIT (OUTPATIENT)
Dept: CARDIOLOGY | Facility: CLINIC | Age: 88
End: 2023-07-13
Attending: INTERNAL MEDICINE
Payer: COMMERCIAL

## 2023-07-13 VITALS
DIASTOLIC BLOOD PRESSURE: 77 MMHG | SYSTOLIC BLOOD PRESSURE: 163 MMHG | HEART RATE: 57 BPM | OXYGEN SATURATION: 100 % | BODY MASS INDEX: 21.95 KG/M2 | WEIGHT: 136 LBS | RESPIRATION RATE: 17 BRPM

## 2023-07-13 DIAGNOSIS — I10 BENIGN ESSENTIAL HYPERTENSION: ICD-10-CM

## 2023-07-13 DIAGNOSIS — I25.10 CORONARY ARTERY DISEASE INVOLVING NATIVE CORONARY ARTERY OF NATIVE HEART WITHOUT ANGINA PECTORIS: ICD-10-CM

## 2023-07-13 DIAGNOSIS — I65.23 ATHEROSCLEROSIS OF BOTH CAROTID ARTERIES: Primary | ICD-10-CM

## 2023-07-13 DIAGNOSIS — E78.5 HYPERLIPIDEMIA LDL GOAL <70: ICD-10-CM

## 2023-07-13 PROCEDURE — 99214 OFFICE O/P EST MOD 30 MIN: CPT | Performed by: NURSE PRACTITIONER

## 2023-07-13 RX ORDER — VITAMIN B COMPLEX
TABLET ORAL DAILY
COMMUNITY

## 2023-07-13 NOTE — PATIENT INSTRUCTIONS
Thank you for your visit with the [facility] today.    Today's Summary     Your goal is to take hydrochlorothiazide 12.5 mg daily for blood pressure control.  Continue monitoring BP at home and keep a log of the numbers.  Watch your salt intake and reduce your salt intake to improve blood pressure.  Recheck your labs (kidney function and electrolytes) in about 1-month and follow up in clinic to discuss results.    If you have questions or concerns, please do not hesitate to call my nursing support team at [phone].    Scheduling phone number: [phone]  [facility] Clinic Number: [phone]    It was a pleasure seeing you today.     JESS Churchill, CNP  Nurse Practitioner  [facility] [place] [facility]  July 13, 2023  ________________________________________________________

## 2023-07-13 NOTE — PROGRESS NOTES
~Cardiology Clinic Visit~    Primary Cardiologist: Dr. Zelaya  Reason for visit: follow up medication change    History of Present Illness     Akil past medical history is significant for hypertension, hyperlipidemia, hypothyroidism, Parkinson's disease and coronary artery disease.     In summary, her coronary history dates back to 01/2020, when she had 5 drug-eluting stents placed to her proximal and mid right coronary artery as well as ostial, proximal and mid circumflex.  In 09/2020, due to recurrent exertional symptoms, a coronary CT angiogram appeared to demonstrate severe in-stent restenosis in the left anterior descending artery.  Angiography demonstrated iFR-negative disease in the LAD and she underwent angioplasty to ISR in her left circumflex coronary artery.  Her  angiogram was complicated by a shower of small strokes noted by MRI.  Her last angiogram also noted a second obtuse marginal that was totally occluded.  Her symptoms included diplopia and right-sided facial droop.     In clinic follow up, she thinks she is doing well.  She is not walking as regularly as she did a year ago where she would do a mile 4-5 times a week, but she gets in plenty of steps every day.   Her blood pressure is checked once a month and always is in the 125-130 range over 60s.      She has side effects or problems with her current medical regimen.    Echocardiogram 10/2021 with EF 65%-70% without focal wall motion abnormalities.  She has a sclerotic mitral valve with mild mitral regurgitation.  Pulmonary pressure is estimated to be 29 mmHg plus her right atrial pressure.      Most recent fasting lipid profile 4/5/23 in care everywhere shows a total cholesterol of 142 and HDL of 66 LDL of 61 and triglycerides of 74.  She does not want to intensify her cholesterol management at this time.     Interval 07/13/23    Today she is feeling well, BP remains elevated even on recheck.  She tells me that she took the  hydrochlorothiazide regularly for one week as originally prescribed, then reduced it to every other day.  As of today (Thursday) she has not taken any so far this week because she thought she only had to take it for leg swelling.  At home recently, systolic readings have been around 150s.  Nothing greater than 160s, and no low readings.  No syx with hydrochlorothiazide.   __________________________________________________________________         Assessment and Impression:     Coronary artery disease  S/p stents x6, 2020  -No clinical evidence of ischemia.    -No symptoms to suggest heart failure or significant arrhythmia.    Hypertension  -Remains high today at 163/77, as usual.   Systolic average 150s at home.      -Took x1 week hydrochlorothiazide, then reduced to every other day and now stopped as she thought it was just for her leg swelling.  -On amlodipine, toprol XL    Hyperlipidemia, on rosuvastatin, excellent lipid control in care everywhere.         Recommendations and Plan:     1. Continue hydrochlorothiazide 12.5 mg daily as consistent, scheduled dose for BP management.  Patient educated on rationale for medication and importance of taking this daily to improve blood pressure.  2. Since she has not taken this medication in about a week, we will recheck BMP in 1 month and follow-[facility] in [facility] to reassess BP and lab results.  3. Education regarding DASH diet provided.  __________________________________________________________________    Thank you for the opportunity to participate in this pleasant patient's care.    We would be happy to see this patient sooner for any concerns in the meantime.    JESS Churchill, CNP   Nurse Practitioner  [facility]    Today's clinic visit entailed:  Review of prior external note(s) from - Outside records from care everywhere (labs)  Review of the result(s) of each unique test - labs, cardiac testing  The following tests were  independently interpreted by me as noted in my documentation: labs  Ordering of each unique test  Prescription drug management    The level of medical decision making during this visit was of moderate complexity.    Orders this Visit:  Orders Placed This Encounter   Procedures     Basic metabolic panel     [facility]     Orders Placed This Encounter   Medications     Vitamin D3 (CHOLECALCIFEROL) 25 mcg (1000 units) tablet     Sig: Take by mouth daily     There are no discontinued medications.  Encounter Diagnoses   Name Primary?     Benign essential hypertension      Atherosclerosis of both carotid arteries Yes     Coronary artery disease involving native coronary artery of native heart without angina pectoris      Hyperlipidemia LDL goal <70      CURRENT MEDICATIONS:  Current Outpatient Medications   Medication Sig Dispense Refill     amLODIPine (NORVASC) 5 MG tablet Take 5 mg by mouth daily       ASPIRIN ADULT LOW STRENGTH PO Take 81 mg by mouth daily       carbidopa-levodopa (SINEMET)  MG tablet TAKE 1 TABLET BY MOUTH TWICE A DAY       Citalopram Hydrobromide (CELEXA PO) Take 10 mg by mouth       levothyroxine (SYNTHROID/LEVOTHROID) 25 MCG tablet TAKE 1 TABLET (25 MCG) BY MOUTH BEFORE BREAKFAST.       metoprolol succinate ER (TOPROL XL) 25 MG 24 hr tablet Take 1 tablet (25 mg) by mouth daily 90 tablet 3     oxybutynin (DITROPAN-XL) 5 MG 24 hr tablet Take by mouth daily       rosuvastatin (CRESTOR) 20 MG tablet Take 20 mg by mouth At Bedtime       Vitamin D3 (CHOLECALCIFEROL) 25 mcg (1000 units) tablet Take by mouth daily       hydrochlorothiazide (HYDRODIURIL) 12.5 MG tablet Take 1 tablet (12.5 mg) by mouth daily (Patient not taking: Reported on 7/13/2023) 90 tablet 4     ALLERGIES     Allergies   Allergen Reactions     Atorvastatin Muscle Pain (Myalgia)     Evolocumab Other (See Comments)     Stiff neck, stiff jaw, runny nose     PAST MEDICAL, SURGICAL, FAMILY, SOCIAL HISTORY:  History  was reviewed and updated as needed, see medical record.    Review of Systems:  A 10-point Review Of Systems is otherwise normal except for that which is noted in the HPI and interval summary.    Physical Exam:    Vitals: BP (!) 163/77   Pulse 57   Resp 17   Wt 61.7 kg (136 lb)   SpO2 100%   BMI 21.95 kg/m    Constitutional: Appears stated age, well nourished, NAD.  Eyes: Pupils equal, round. Sclerae anicteric.   HEENT: Normocephalic, atraumatic.   Neck: Supple. Carotid pulses full and equal. No carotid bruit.  No JVD appreciated.  Respiratory: Non-labored. Lungs CTAB.  Cardiovascular: RRR, normal S1 and S2. No M/G/R.  No edema.  GI: Soft, non-distended, non-tender.  Skin: Warm and dry. No rashes, cyanosis, edema.  Musculoskeletal/Extremities: Symmetrical movement to all extremities.  Neurologic: No gross focal deficits. Alert, awake, and oriented to all spheres.  Psychiatric: Affect appropriate. Mentation normal.    Recent Lab Results:  LIPID RESULTS:  Lab Results   Component Value Date    CHOL 188 01/08/2020    HDL 49 01/08/2020     01/08/2020    TRIG 110 01/08/2020     LIVER ENZYME RESULTS:  Lab Results   Component Value Date    AST 24 12/30/2019    ALT 17 12/30/2019     CBC RESULTS:  No results found for: WBC, RBC, HGB, HCT, MCV, MCH, MCHC, RDW, PLT  BMP RESULTS:  Lab Results   Component Value Date    POTASSIUM 4.3 12/30/2019    GLC 98 12/30/2019    CR 1.09 12/30/2019    GFRESTIMATED 48 (L) 12/30/2019    GFRESTBLACK 58 (L) 12/30/2019      A1C RESULTS:  No results found for: A1C  INR RESULTS:  No results found for: INR

## 2023-07-13 NOTE — LETTER
7/13/2023    Edouard Wolf MD  [address] [place] [address]    RE: Mariam Azar       Dear Colleague,     I had the pleasure of seeing Mariamjuanita Azar in the [facility].              ~Cardiology Clinic Visit~    Primary Cardiologist: Dr. Zelaya  Reason for visit: follow up medication change    History of Present Illness     Mariam's past medical history is significant for hypertension, hyperlipidemia, hypothyroidism, Parkinson's disease and coronary artery disease.     In summary, her coronary history dates back to 01/2020, when she had 5 drug-eluting stents placed to her proximal and mid right coronary artery as well as ostial, proximal and mid circumflex.  In 09/2020, due to recurrent exertional symptoms, a coronary CT angiogram appeared to demonstrate severe in-stent restenosis in the left anterior descending artery.  Angiography demonstrated iFR-negative disease in the LAD and she underwent angioplasty to ISR in her left circumflex coronary artery.  Her  angiogram was complicated by a shower of small strokes noted by MRI.  Her last angiogram also noted a second obtuse marginal that was totally occluded.  Her symptoms included diplopia and right-sided facial droop.     In clinic follow up, she thinks she is doing well.  She is not walking as regularly as she did a year ago where she would do a mile 4-5 times a week, but she gets in plenty of steps every day.   Her blood pressure is checked once a month and always is in the 125-130 range over 60s.      She has side effects or problems with her current medical regimen.    Echocardiogram 10/2021 with EF 65%-70% without focal wall motion abnormalities.  She has a sclerotic mitral valve with mild mitral regurgitation.  Pulmonary pressure is estimated to be 29 mmHg plus her right atrial pressure.      Most recent fasting lipid profile 4/5/23 in care everywhere shows a total cholesterol of 142 and HDL of 66 LDL of 61 and  triglycerides of 74.  She does not want to intensify her cholesterol management at this time.     Interval 07/13/23    Today she is feeling well, BP remains elevated even on recheck.  She tells me that she took the hydrochlorothiazide regularly for one week as originally prescribed, then reduced it to every other day.  As of today (Thursday) she has not taken any so far this week because she thought she only had to take it for leg swelling.  At home recently, systolic readings have been around 150s.  Nothing greater than 160s, and no low readings.  No syx with hydrochlorothiazide.   __________________________________________________________________         Assessment and Impression:     Coronary artery disease  S/p stents x6, 2020  -No clinical evidence of ischemia.    -No symptoms to suggest heart failure or significant arrhythmia.    Hypertension  -Remains high today at 163/77, as usual.   Systolic average 150s at home.      -Took x1 week hydrochlorothiazide, then reduced to every other day and now stopped as she thought it was just for her leg swelling.  -On amlodipine, toprol XL    Hyperlipidemia, on rosuvastatin, excellent lipid control in care everywhere.         Recommendations and Plan:     Continue hydrochlorothiazide 12.5 mg daily as consistent, scheduled dose for BP management.  Patient educated on rationale for medication and importance of taking this daily to improve blood pressure.  Since she has not taken this medication in about a week, we will recheck BMP in 1 month and [facility] and lab results.  Education regarding DASH diet provided.  __________________________________________________________________    Thank you for the opportunity to participate in this pleasant patient's care.    We would be happy to see this patient sooner for any concerns in the meantime.    Gerri Shetty, APRN, CNP   Nurse Practitioner  [facility]    Today's  clinic visit entailed:  Review of prior external note(s) from - Outside records from care everywhere (labs)  Review of the result(s) of each unique test - labs, cardiac testing  The following tests were independently interpreted by me as noted in my documentation: labs  Ordering of each unique test  Prescription drug management    The level of medical decision making during this visit was of moderate complexity.    Orders this Visit:  Orders Placed This Encounter   Procedures    Basic metabolic panel    [facility]     Orders Placed This Encounter   Medications    Vitamin D3 (CHOLECALCIFEROL) 25 mcg (1000 units) tablet     Sig: Take by mouth daily     There are no discontinued medications.  Encounter Diagnoses   Name Primary?    Benign essential hypertension     Atherosclerosis of both carotid arteries Yes    Coronary artery disease involving native coronary artery of native heart without angina pectoris     Hyperlipidemia LDL goal <70      CURRENT MEDICATIONS:  Current Outpatient Medications   Medication Sig Dispense Refill    amLODIPine (NORVASC) 5 MG tablet Take 5 mg by mouth daily      ASPIRIN ADULT LOW STRENGTH PO Take 81 mg by mouth daily      carbidopa-levodopa (SINEMET)  MG tablet TAKE 1 TABLET BY MOUTH TWICE A DAY      Citalopram Hydrobromide (CELEXA PO) Take 10 mg by mouth      levothyroxine (SYNTHROID/LEVOTHROID) 25 MCG tablet TAKE 1 TABLET (25 MCG) BY MOUTH BEFORE BREAKFAST.      metoprolol succinate ER (TOPROL XL) 25 MG 24 hr tablet Take 1 tablet (25 mg) by mouth daily 90 tablet 3    oxybutynin (DITROPAN-XL) 5 MG 24 hr tablet Take by mouth daily      rosuvastatin (CRESTOR) 20 MG tablet Take 20 mg by mouth At Bedtime      Vitamin D3 (CHOLECALCIFEROL) 25 mcg (1000 units) tablet Take by mouth daily      hydrochlorothiazide (HYDRODIURIL) 12.5 MG tablet Take 1 tablet (12.5 mg) by mouth daily (Patient not taking: Reported on 7/13/2023) 90 tablet 4     ALLERGIES     Allergies    Allergen Reactions    Atorvastatin Muscle Pain (Myalgia)    Evolocumab Other (See Comments)     Stiff neck, stiff jaw, runny nose     PAST MEDICAL, SURGICAL, FAMILY, SOCIAL HISTORY:  History was reviewed and updated as needed, see medical record.    Review of Systems:  A 10-point Review Of Systems is otherwise normal except for that which is noted in the HPI and interval summary.    Physical Exam:    Vitals: BP (!) 163/77   Pulse 57   Resp 17   Wt 61.7 kg (136 lb)   SpO2 100%   BMI 21.95 kg/m    Constitutional: Appears stated age, well nourished, NAD.  Eyes: Pupils equal, round. Sclerae anicteric.   HEENT: Normocephalic, atraumatic.   Neck: Supple. Carotid pulses full and equal. No carotid bruit.  No JVD appreciated.  Respiratory: Non-labored. Lungs CTAB.  Cardiovascular: RRR, normal S1 and S2. No M/G/R.  No edema.  GI: Soft, non-distended, non-tender.  Skin: Warm and dry. No rashes, cyanosis, edema.  Musculoskeletal/Extremities: Symmetrical movement to all extremities.  Neurologic: No gross focal deficits. Alert, awake, and oriented to all spheres.  Psychiatric: Affect appropriate. Mentation normal.    Recent Lab Results:  LIPID RESULTS:  Lab Results   Component Value Date    CHOL 188 01/08/2020    HDL 49 01/08/2020     01/08/2020    TRIG 110 01/08/2020     LIVER ENZYME RESULTS:  Lab Results   Component Value Date    AST 24 12/30/2019    ALT 17 12/30/2019     CBC RESULTS:  No results found for: WBC, RBC, HGB, HCT, MCV, MCH, MCHC, RDW, PLT  BMP RESULTS:  Lab Results   Component Value Date    POTASSIUM 4.3 12/30/2019    GLC 98 12/30/2019    CR 1.09 12/30/2019    GFRESTIMATED 48 (L) 12/30/2019    GFRESTBLACK 58 (L) 12/30/2019      A1C RESULTS:  No results found for: A1C  INR RESULTS:  No results found for: INR    Thank you for allowing me to participate in the care of your patient.      Sincerely,     JESS Churchill CNP   [facility]  cc:    Jomar Zelaya MD  [address]  [place] [id number]   24-Mar-2021 10:00

## 2023-08-28 ENCOUNTER — LAB (OUTPATIENT)
Dept: LAB | Facility: CLINIC | Age: 88
End: 2023-08-28
Payer: COMMERCIAL

## 2023-08-28 ENCOUNTER — OFFICE VISIT (OUTPATIENT)
Dept: CARDIOLOGY | Facility: CLINIC | Age: 88
End: 2023-08-28
Attending: NURSE PRACTITIONER
Payer: COMMERCIAL

## 2023-08-28 VITALS
OXYGEN SATURATION: 97 % | SYSTOLIC BLOOD PRESSURE: 140 MMHG | WEIGHT: 133 LBS | HEIGHT: 66 IN | HEART RATE: 62 BPM | DIASTOLIC BLOOD PRESSURE: 72 MMHG | BODY MASS INDEX: 21.38 KG/M2

## 2023-08-28 DIAGNOSIS — R06.09 DYSPNEA ON EXERTION: Primary | ICD-10-CM

## 2023-08-28 DIAGNOSIS — I25.10 CORONARY ARTERY DISEASE INVOLVING NATIVE CORONARY ARTERY OF NATIVE HEART WITHOUT ANGINA PECTORIS: ICD-10-CM

## 2023-08-28 DIAGNOSIS — I10 BENIGN ESSENTIAL HYPERTENSION: ICD-10-CM

## 2023-08-28 DIAGNOSIS — E78.5 HYPERLIPIDEMIA LDL GOAL <70: ICD-10-CM

## 2023-08-28 LAB
ANION GAP SERPL CALCULATED.3IONS-SCNC: 10 MMOL/L (ref 7–15)
BUN SERPL-MCNC: 19.5 MG/DL (ref 8–23)
CALCIUM SERPL-MCNC: 9.8 MG/DL (ref 8.8–10.2)
CHLORIDE SERPL-SCNC: 99 MMOL/L (ref 98–107)
CREAT SERPL-MCNC: 0.94 MG/DL (ref 0.51–0.95)
DEPRECATED HCO3 PLAS-SCNC: 30 MMOL/L (ref 22–29)
GFR SERPL CREATININE-BSD FRML MDRD: 59 ML/MIN/1.73M2
GLUCOSE SERPL-MCNC: 96 MG/DL (ref 70–99)
POTASSIUM SERPL-SCNC: 3.9 MMOL/L (ref 3.4–5.3)
SODIUM SERPL-SCNC: 139 MMOL/L (ref 136–145)

## 2023-08-28 PROCEDURE — 80048 BASIC METABOLIC PNL TOTAL CA: CPT | Performed by: NURSE PRACTITIONER

## 2023-08-28 PROCEDURE — 99214 OFFICE O/P EST MOD 30 MIN: CPT | Performed by: NURSE PRACTITIONER

## 2023-08-28 PROCEDURE — 36415 COLL VENOUS BLD VENIPUNCTURE: CPT | Performed by: NURSE PRACTITIONER

## 2023-08-28 NOTE — LETTER
8/28/2023    Edouard Wolf MD  [address] [place] [address]    RE: Mariam Azar       Dear Colleague,     I had the pleasure of seeing Mariam ROGELIO Azar in the [facility].              ~Cardiology Clinic Visit~    Primary Cardiologist: Dr. Zelaya  Reason for visit: BP and medication follow up    History of Present Illness  Mariam's past medical history is significant for hypertension, hyperlipidemia, hypothyroidism, Parkinson's disease and coronary artery disease.     In summary, her coronary history dates back to 01/2020, when she had 5 drug-eluting stents placed to her proximal and mid right coronary artery as well as ostial, proximal and mid circumflex.  In 09/2020, due to recurrent exertional symptoms, a coronary CT angiogram appeared to demonstrate severe in-stent restenosis in the left anterior descending artery.  Angiography demonstrated iFR-negative disease in the LAD and she underwent angioplasty to ISR in her left circumflex coronary artery.  Her  angiogram was complicated by a shower of small strokes noted by MRI.  Her last angiogram also noted a second obtuse marginal that was totally occluded.  Her symptoms included diplopia and right-sided facial droop.     Echocardiogram 10/2021 with EF 65%-70% without focal wall motion abnormalities.  She has a sclerotic mitral valve with mild mitral regurgitation.  Pulmonary pressure is estimated to be 29 mmHg plus her right atrial pressure.      Most recent fasting lipid profile 4/5/23 in care everywhere shows a , HDL 66, LDL 61 and triglycerides of 74.    She continues to remain active and getting plenty of steps every single day, though does mention she is not walking as regularly as she usually does.  She is aiming to improve upon this.    When I saw her last, in July 2023, she was feeling well but BP remained elevated even on recheck.  We discussed the use of hydralazine for blood pressure control versus as  needed for swelling.  Her home BP readings were averaging systolic 150s, but no greater than 160s.  She had no low blood pressure readings or side effects.  She had no symptoms with hydrochlorothiazide.  Based on high BP readings, we restarted her hydrochlorothiazide 12.5 mg daily, as a scheduled regimen rather than just as needed.    Interval 08/28/23    BP today is trending better, recheck showed /72 with consistent hydrochlorothiazide dosing.  BMP today was also stable with Cr 0.94, GFR 59, BUN 19.5, K 3.9, Na 139.  She does note that over the past few months, she says that she has been experiencing some exertional shortness of breath - she is only able to walk about 15 feet and then feels the need to rest.  She also has struggled with significant fatigue even after a restful night of sleep.  __________________________________________________________________         Assessment and Impression:     Coronary artery disease  S/p stents x6, 2020  -No clinical evidence of ischemia.    -No symptoms to suggest heart failure or significant arrhythmia.     Hypertension  -Elevated today; trend somewhat improving but systolic 140s.  -On hydrochlorothiazide 12.5 mg daily.  -Recheck BMP stable, Cr 0.94, Na 139, K 3.9  -On amlodipine, toprol XL     Hyperlipidemia, on rosuvastatin, excellent lipid control in care everywhere.         Recommendations and Plan:     Repeat CT coronary angiogram and echocardiogram based on presenting symptoms to evaluate for worsening coronary disease or new functional changes.  Continue current medication regiment without changes today.  No adjustments made based on lab results from today.  Follow up in 3-4 months with Dr. Zelaya to review test results.  __________________________________________________________________    Thank you for the opportunity to participate in this pleasant patient's care.    We would be happy to see this patient sooner for any concerns in the meantime.    Gerri  JESS Shetty, CNP   Nurse Practitioner  [facility] [place]    Today's clinic visit entailed:  Review of the result(s) of each unique test - cardiac imaging and testing, CT, labs  The following tests were independently interpreted by me as noted in my documentation: labs  Ordering of each unique test  Prescription drug management    The level of medical decision making during this visit was of moderate complexity.    Orders this Visit:  Orders Placed This Encounter   Procedures    Follow-Up with Cardiology    Echocardiogram Complete     No orders of the defined types were placed in this encounter.    There are no discontinued medications.  Encounter Diagnoses   Name Primary?    Benign essential hypertension     Dyspnea on exertion Yes    Coronary artery disease involving native coronary artery of native heart without angina pectoris     Hyperlipidemia LDL goal <70      CURRENT MEDICATIONS:  Current Outpatient Medications   Medication Sig Dispense Refill    amLODIPine (NORVASC) 5 MG tablet Take 5 mg by mouth daily      ASPIRIN ADULT LOW STRENGTH PO Take 81 mg by mouth daily      carbidopa-levodopa (SINEMET)  MG tablet TAKE 1 TABLET BY MOUTH TWICE A DAY      Citalopram Hydrobromide (CELEXA PO) Take 10 mg by mouth      hydrochlorothiazide (HYDRODIURIL) 12.5 MG tablet Take 1 tablet (12.5 mg) by mouth daily 90 tablet 4    levothyroxine (SYNTHROID/LEVOTHROID) 25 MCG tablet TAKE 1 TABLET (25 MCG) BY MOUTH BEFORE BREAKFAST.      metoprolol succinate ER (TOPROL XL) 25 MG 24 hr tablet Take 1 tablet (25 mg) by mouth daily 90 tablet 3    oxybutynin (DITROPAN-XL) 5 MG 24 hr tablet Take by mouth daily      rosuvastatin (CRESTOR) 20 MG tablet Take 20 mg by mouth At Bedtime      Vitamin D3 (CHOLECALCIFEROL) 25 mcg (1000 units) tablet Take by mouth daily       ALLERGIES     Allergies   Allergen Reactions    Atorvastatin Muscle Pain (Myalgia)    Evolocumab Other (See Comments)     Stiff neck, stiff jaw, runny  "nose     PAST MEDICAL, SURGICAL, FAMILY, SOCIAL HISTORY:  History was reviewed and updated as needed, see medical record.    Review of Systems:  A 10-point Review Of Systems is otherwise normal except for that which is noted in the HPI and interval summary.    Physical Exam:    Vitals: BP (!) 140/72   Pulse 62   Ht 1.676 m (5' 6\")   Wt 60.3 kg (133 lb)   SpO2 97%   BMI 21.47 kg/m    Constitutional: Appears stated age, well nourished, NAD.  Neck: Supple. Carotid pulses full and equal.  Respiratory: Non-labored. Lungs CTAB.  Cardiovascular: RRR, normal S1 and S2. No M/G/R.  no edema.  GI: Soft, non-distended, non-tender.  Skin: Warm and dry. No rashes, cyanosis, edema.  Musculoskeletal/Extremities: Symmetrical movement to all extremities.  Neurologic: No gross focal deficits. Alert, awake, and oriented to all spheres.  Psychiatric: Affect appropriate. Mentation normal.    Recent Lab Results:  LIPID RESULTS:  Lab Results   Component Value Date    CHOL 188 01/08/2020    HDL 49 01/08/2020     01/08/2020    TRIG 110 01/08/2020     LIVER ENZYME RESULTS:  Lab Results   Component Value Date    AST 24 12/30/2019    ALT 17 12/30/2019     CBC RESULTS:  No results found for: WBC, RBC, HGB, HCT, MCV, MCH, MCHC, RDW, PLT  BMP RESULTS:  Lab Results   Component Value Date     08/28/2023    POTASSIUM 3.9 08/28/2023    POTASSIUM 4.3 12/30/2019    CHLORIDE 99 08/28/2023    CO2 30 (H) 08/28/2023    ANIONGAP 10 08/28/2023    GLC 96 08/28/2023    GLC 98 12/30/2019    BUN 19.5 08/28/2023    CR 0.94 08/28/2023    CR 1.09 12/30/2019    GFRESTIMATED 59 (L) 08/28/2023    GFRESTIMATED 48 (L) 12/30/2019    GFRESTBLACK 58 (L) 12/30/2019    RODRIGUE 9.8 08/28/2023      A1C RESULTS:  No results found for: A1C  INR RESULTS:  No results found for: INR      Thank you for allowing me to participate in the care of your patient.      Sincerely,     JESS Churchill CNP     [facility]  cc:   Gerri Shetty, APRN CNP  [address]  [place] [address]

## 2023-08-28 NOTE — PROGRESS NOTES
~Cardiology Clinic Visit~    Primary Cardiologist: Dr. Zealya  Reason for visit: BP and medication follow up    History of Present Illness  Mariam's past medical history is significant for hypertension, hyperlipidemia, hypothyroidism, Parkinson's disease and coronary artery disease.     In summary, her coronary history dates back to 01/2020, when she had 5 drug-eluting stents placed to her proximal and mid right coronary artery as well as ostial, proximal and mid circumflex.  In 09/2020, due to recurrent exertional symptoms, a coronary CT angiogram appeared to demonstrate severe in-stent restenosis in the left anterior descending artery.  Angiography demonstrated iFR-negative disease in the LAD and she underwent angioplasty to ISR in her left circumflex coronary artery.  Her  angiogram was complicated by a shower of small strokes noted by MRI.  Her last angiogram also noted a second obtuse marginal that was totally occluded.  Her symptoms included diplopia and right-sided facial droop.     Echocardiogram 10/2021 with EF 65%-70% without focal wall motion abnormalities.  She has a sclerotic mitral valve with mild mitral regurgitation.  Pulmonary pressure is estimated to be 29 mmHg plus her right atrial pressure.      Most recent fasting lipid profile 4/5/23 in care everywhere shows a , HDL 66, LDL 61 and triglycerides of 74.    She continues to remain active and getting plenty of steps every single day, though does mention she is not walking as regularly as she usually does.  She is aiming to improve upon this.    When I saw her last, in July 2023, she was feeling well but BP remained elevated even on recheck.  We discussed the use of hydralazine for blood pressure control versus as needed for swelling.  Her home BP readings were averaging systolic 150s, but no greater than 160s.  She had no low blood pressure readings or side effects.  She had no symptoms with hydrochlorothiazide.  Based on high  BP readings, we restarted her hydrochlorothiazide 12.5 mg daily, as a scheduled regimen rather than just as needed.    Interval 08/28/23    BP today is trending better, recheck showed /72 with consistent hydrochlorothiazide dosing.  BMP today was also stable with Cr 0.94, GFR 59, BUN 19.5, K 3.9, Na 139.  She does note that over the past few months, she says that she has been experiencing some exertional shortness of breath - she is only able to walk about 15 feet and then feels the need to rest.  She also has struggled with significant fatigue even after a restful night of sleep.  __________________________________________________________________         Assessment and Impression:     Coronary artery disease  S/p stents x6, 2020  -No clinical evidence of ischemia.    -No symptoms to suggest heart failure or significant arrhythmia.     Hypertension  -Elevated today; trend somewhat improving but systolic 140s.  -On hydrochlorothiazide 12.5 mg daily.  -Recheck BMP stable, Cr 0.94, Na 139, K 3.9  -On amlodipine, toprol XL     Hyperlipidemia, on rosuvastatin, excellent lipid control in care everywhere.         Recommendations and Plan:     Repeat CT coronary angiogram and echocardiogram based on presenting symptoms to evaluate for worsening coronary disease or new functional changes.  Continue current medication regiment without changes today.  No adjustments made based on lab results from today.  Follow up in 3-4 months with Dr. Zelaya to review test results.  __________________________________________________________________    Thank you for the opportunity to participate in this pleasant patient's care.    We would be happy to see this patient sooner for any concerns in the meantime.    JESS Churchill, CNP   Nurse Practitioner  [facility]    Today's clinic visit entailed:  Review of the result(s) of each unique test - cardiac imaging and testing, CT, labs  The following tests  were independently interpreted by me as noted in my documentation: labs  Ordering of each unique test  Prescription drug management    The level of medical decision making during this visit was of moderate complexity.    Orders this Visit:  Orders Placed This Encounter   Procedures    Follow-Up with Cardiology    Echocardiogram Complete     No orders of the defined types were placed in this encounter.    There are no discontinued medications.  Encounter Diagnoses   Name Primary?    Benign essential hypertension     Dyspnea on exertion Yes    Coronary artery disease involving native coronary artery of native heart without angina pectoris     Hyperlipidemia LDL goal <70      CURRENT MEDICATIONS:  Current Outpatient Medications   Medication Sig Dispense Refill    amLODIPine (NORVASC) 5 MG tablet Take 5 mg by mouth daily      ASPIRIN ADULT LOW STRENGTH PO Take 81 mg by mouth daily      carbidopa-levodopa (SINEMET)  MG tablet TAKE 1 TABLET BY MOUTH TWICE A DAY      Citalopram Hydrobromide (CELEXA PO) Take 10 mg by mouth      hydrochlorothiazide (HYDRODIURIL) 12.5 MG tablet Take 1 tablet (12.5 mg) by mouth daily 90 tablet 4    levothyroxine (SYNTHROID/LEVOTHROID) 25 MCG tablet TAKE 1 TABLET (25 MCG) BY MOUTH BEFORE BREAKFAST.      metoprolol succinate ER (TOPROL XL) 25 MG 24 hr tablet Take 1 tablet (25 mg) by mouth daily 90 tablet 3    oxybutynin (DITROPAN-XL) 5 MG 24 hr tablet Take by mouth daily      rosuvastatin (CRESTOR) 20 MG tablet Take 20 mg by mouth At Bedtime      Vitamin D3 (CHOLECALCIFEROL) 25 mcg (1000 units) tablet Take by mouth daily       ALLERGIES     Allergies   Allergen Reactions    Atorvastatin Muscle Pain (Myalgia)    Evolocumab Other (See Comments)     Stiff neck, stiff jaw, runny nose     PAST MEDICAL, SURGICAL, FAMILY, SOCIAL HISTORY:  History was reviewed and updated as needed, see medical record.    Review of Systems:  A 10-point Review Of Systems is otherwise normal except for that which is  "noted in the HPI and interval summary.    Physical Exam:    Vitals: BP (!) 140/72   Pulse 62   Ht 1.676 m (5' 6\")   Wt 60.3 kg (133 lb)   SpO2 97%   BMI 21.47 kg/m    Constitutional: Appears stated age, well nourished, NAD.  Neck: Supple. Carotid pulses full and equal.  Respiratory: Non-labored. Lungs CTAB.  Cardiovascular: RRR, normal S1 and S2. No M/G/R.  no edema.  GI: Soft, non-distended, non-tender.  Skin: Warm and dry. No rashes, cyanosis, edema.  Musculoskeletal/Extremities: Symmetrical movement to all extremities.  Neurologic: No gross focal deficits. Alert, awake, and oriented to all spheres.  Psychiatric: Affect appropriate. Mentation normal.    Recent Lab Results:  LIPID RESULTS:  Lab Results   Component Value Date    CHOL 188 01/08/2020    HDL 49 01/08/2020     01/08/2020    TRIG 110 01/08/2020     LIVER ENZYME RESULTS:  Lab Results   Component Value Date    AST 24 12/30/2019    ALT 17 12/30/2019     CBC RESULTS:  No results found for: WBC, RBC, HGB, HCT, MCV, MCH, MCHC, RDW, PLT  BMP RESULTS:  Lab Results   Component Value Date     08/28/2023    POTASSIUM 3.9 08/28/2023    POTASSIUM 4.3 12/30/2019    CHLORIDE 99 08/28/2023    CO2 30 (H) 08/28/2023    ANIONGAP 10 08/28/2023    GLC 96 08/28/2023    GLC 98 12/30/2019    BUN 19.5 08/28/2023    CR 0.94 08/28/2023    CR 1.09 12/30/2019    GFRESTIMATED 59 (L) 08/28/2023    GFRESTIMATED 48 (L) 12/30/2019    GFRESTBLACK 58 (L) 12/30/2019    RODRIGUE 9.8 08/28/2023      A1C RESULTS:  No results found for: A1C  INR RESULTS:  No results found for: INR                  "

## 2023-08-28 NOTE — PATIENT INSTRUCTIONS
Thank you for your visit with the [facility] today.    Today's Summary     Repeat a CT scan of the heart.  Repeat an echocardiogram.  Continue current medications without changes.  Follow up in 3-4 months with Dr. Zelaya.  We will let you know if there are any concerns with your lab results.  If you do not hear anything, then continue with our current plan.    If you have questions or concerns, please do not hesitate to call my nursing support team at [phone].    Scheduling phone number: [phone]  [facility] Clinic Number: [phone]    It was a pleasure seeing you today.     JESS Churchill, CNP  Nurse Practitioner  [facility]  August 28, 2023  ________________________________________________________

## 2023-08-30 ENCOUNTER — TELEPHONE (OUTPATIENT)
Dept: CARDIOLOGY | Facility: CLINIC | Age: 88
End: 2023-08-30

## 2023-08-30 NOTE — TELEPHONE ENCOUNTER
[facility] Call Center    Phone Message    May a detailed message be left on voicemail: yes     Reason for Call: Other: Pt requesting a call back to assist in scheduling the CT angiogram at the [place] location.      Action Taken: Other: Cardiology    Travel Screening: Not Applicable    Thank you!  [facility]

## 2023-08-31 ENCOUNTER — TELEPHONE (OUTPATIENT)
Dept: CARDIOLOGY | Facility: CLINIC | Age: 88
End: 2023-08-31

## 2023-08-31 NOTE — TELEPHONE ENCOUNTER
[facility] Call Center    Phone Message    May a detailed message be left on voicemail: yes     Reason for Call: Other: Please call pt to schedule the CT Angiogram coronary artery in [place].      Action Taken: Message routed to:  [facility] cardio    Travel Screening: Not Applicable    Thank you!  [facility]

## 2023-09-26 ENCOUNTER — HOSPITAL ENCOUNTER (OUTPATIENT)
Dept: CARDIOLOGY | Facility: CLINIC | Age: 88
Discharge: HOME | End: 2023-09-26
Attending: NURSE PRACTITIONER
Payer: COMMERCIAL

## 2023-09-26 VITALS — HEART RATE: 55 BPM | DIASTOLIC BLOOD PRESSURE: 74 MMHG | SYSTOLIC BLOOD PRESSURE: 148 MMHG

## 2023-09-26 DIAGNOSIS — R06.09 DYSPNEA ON EXERTION: ICD-10-CM

## 2023-09-26 DIAGNOSIS — I25.10 CORONARY ARTERY DISEASE INVOLVING NATIVE CORONARY ARTERY OF NATIVE HEART WITHOUT ANGINA PECTORIS: ICD-10-CM

## 2023-09-26 LAB — LVEF ECHO: NORMAL

## 2023-09-26 PROCEDURE — 75574 CT ANGIO HRT W/3D IMAGE: CPT | Mod: 26 | Performed by: INTERNAL MEDICINE

## 2023-09-26 PROCEDURE — 250N000011 HC RX IP 250 OP 636: Performed by: NURSE PRACTITIONER

## 2023-09-26 PROCEDURE — 93306 TTE W/DOPPLER COMPLETE: CPT | Mod: 26 | Performed by: INTERNAL MEDICINE

## 2023-09-26 PROCEDURE — 93306 TTE W/DOPPLER COMPLETE: CPT

## 2023-09-26 PROCEDURE — 75574 CT ANGIO HRT W/3D IMAGE: CPT

## 2023-09-26 PROCEDURE — 250N000013 HC RX MED GY IP 250 OP 250 PS 637: Performed by: NURSE PRACTITIONER

## 2023-09-26 RX ORDER — DIPHENHYDRAMINE HYDROCHLORIDE 50 MG/ML
25-50 INJECTION INTRAMUSCULAR; INTRAVENOUS
Status: DISCONTINUED | OUTPATIENT
Start: 2023-09-26 | End: 2023-09-27 | Stop reason: HOSPADM

## 2023-09-26 RX ORDER — IVABRADINE 5 MG/1
5-15 TABLET, FILM COATED ORAL
Status: DISCONTINUED | OUTPATIENT
Start: 2023-09-26 | End: 2023-09-27 | Stop reason: HOSPADM

## 2023-09-26 RX ORDER — DIPHENHYDRAMINE HCL 25 MG
25 CAPSULE ORAL
Status: DISCONTINUED | OUTPATIENT
Start: 2023-09-26 | End: 2023-09-27 | Stop reason: HOSPADM

## 2023-09-26 RX ORDER — METOPROLOL TARTRATE 25 MG/1
25-100 TABLET, FILM COATED ORAL
Status: DISCONTINUED | OUTPATIENT
Start: 2023-09-26 | End: 2023-09-27 | Stop reason: HOSPADM

## 2023-09-26 RX ORDER — METOPROLOL TARTRATE 1 MG/ML
5-15 INJECTION, SOLUTION INTRAVENOUS
Status: DISCONTINUED | OUTPATIENT
Start: 2023-09-26 | End: 2023-09-27 | Stop reason: HOSPADM

## 2023-09-26 RX ORDER — IOPAMIDOL 755 MG/ML
500 INJECTION, SOLUTION INTRAVASCULAR ONCE
Status: COMPLETED | OUTPATIENT
Start: 2023-09-26 | End: 2023-09-26

## 2023-09-26 RX ORDER — DILTIAZEM HYDROCHLORIDE 5 MG/ML
10-15 INJECTION INTRAVENOUS
Status: DISCONTINUED | OUTPATIENT
Start: 2023-09-26 | End: 2023-09-27 | Stop reason: HOSPADM

## 2023-09-26 RX ORDER — METHYLPREDNISOLONE SODIUM SUCCINATE 125 MG/2ML
125 INJECTION, POWDER, LYOPHILIZED, FOR SOLUTION INTRAMUSCULAR; INTRAVENOUS
Status: DISCONTINUED | OUTPATIENT
Start: 2023-09-26 | End: 2023-09-27 | Stop reason: HOSPADM

## 2023-09-26 RX ORDER — NITROGLYCERIN 0.4 MG/1
0.4 TABLET SUBLINGUAL
Status: DISCONTINUED | OUTPATIENT
Start: 2023-09-26 | End: 2023-09-27 | Stop reason: HOSPADM

## 2023-09-26 RX ORDER — DILTIAZEM HCL 60 MG
120 TABLET ORAL
Status: DISCONTINUED | OUTPATIENT
Start: 2023-09-26 | End: 2023-09-27 | Stop reason: HOSPADM

## 2023-09-26 RX ORDER — ACYCLOVIR 200 MG/1
0-1 CAPSULE ORAL
Status: DISCONTINUED | OUTPATIENT
Start: 2023-09-26 | End: 2023-09-27 | Stop reason: HOSPADM

## 2023-09-26 RX ORDER — ONDANSETRON 2 MG/ML
4 INJECTION INTRAMUSCULAR; INTRAVENOUS
Status: DISCONTINUED | OUTPATIENT
Start: 2023-09-26 | End: 2023-09-27 | Stop reason: HOSPADM

## 2023-09-26 RX ADMIN — IOPAMIDOL 110 ML: 755 INJECTION, SOLUTION INTRAVENOUS at 13:53

## 2023-09-26 RX ADMIN — NITROGLYCERIN 0.4 MG: 0.4 TABLET SUBLINGUAL at 13:43

## 2023-09-27 ENCOUNTER — TELEPHONE (OUTPATIENT)
Dept: CARDIOLOGY | Facility: CLINIC | Age: 88
End: 2023-09-27
Payer: COMMERCIAL

## 2023-09-27 ENCOUNTER — TELEPHONE (OUTPATIENT)
Dept: CARDIOLOGY | Facility: CLINIC | Age: 88
End: 2023-09-27

## 2023-09-27 DIAGNOSIS — R06.02 SHORTNESS OF BREATH: ICD-10-CM

## 2023-09-27 DIAGNOSIS — R06.09 DYSPNEA ON EXERTION: Primary | ICD-10-CM

## 2023-09-27 DIAGNOSIS — R91.8 PULMONARY NODULES: Primary | ICD-10-CM

## 2023-09-27 NOTE — TELEPHONE ENCOUNTER
----- Message from Jomar Zelaya MD sent at 9/26/2023  7:29 PM CDT -----  Reynaldo called me and said CTa scan was unreadable because of the number of stents.  Lets do a modified Wilfred protocol stress echo to look for chronotropic incompetence, blood pressure response, underlying coronary artery disease and possibly deteriorating mitral regurgitation.  ----- Message -----  From: Reynaldo García MD  Sent: 9/26/2023   3:11 PM CDT  To: JESS Churchill CNP; #    Non diagnostic study.  Cannot evaluate lumen in this lady with multiple stents in all 3 coronary arteries..  Most recent note indicated that she is doing well so am not sure what the indication for imaging evaluation is.  Thanks.

## 2023-09-27 NOTE — TELEPHONE ENCOUNTER
[facility] Call Center    Phone Message    May a detailed message be left on voicemail: yes     Reason for Call: Other: Pt would like a call back to discuss scheduling an echo stress in [place]     Action Taken: Other: [facility]    Travel Screening: Not Applicable

## 2023-09-27 NOTE — TELEPHONE ENCOUNTER
Radiology report:                                                                 IMPRESSION:   1.  Few indeterminate nodular opacities in the visualized portions of  the lungs. The largest is a 9 mm nodular opacity in the right middle  lobe which may be a focus of bronchial mucus plugging and atelectasis.  Recommend a follow-up CT of the chest in 8-12 weeks.    Will message provider for recommendations. MARCIA Ro RN

## 2023-09-27 NOTE — TELEPHONE ENCOUNTER
Attempted to call Patient, - no answer.  regarding recommendation for a different test.- modified Wilfred protocol stress echo to look for chronotropic incompetence, blood pressure response, underlying coronary artery disease and possibly deteriorating mitral regurgitation.       Dr. Zelaya message 9-26-23  Reynaldo called me and said CTa scan was unreadable because of the number of stents.  Lets do a modified Wilfred protocol stress echo to look for chronotropic incompetence, blood pressure response, underlying coronary artery disease and possibly deteriorating mitral regurgitation

## 2023-09-27 NOTE — TELEPHONE ENCOUNTER
Spoke with patient to review the CT results as not-useable and Dr. Zelaya's recommendation to try a stress echo. Patient is willing to do this.   Message sent to scheduling.

## 2023-10-03 ENCOUNTER — HOSPITAL ENCOUNTER (OUTPATIENT)
Dept: CARDIOLOGY | Facility: CLINIC | Age: 88
Discharge: HOME | End: 2023-10-03
Attending: NURSE PRACTITIONER | Admitting: NURSE PRACTITIONER
Payer: COMMERCIAL

## 2023-10-03 DIAGNOSIS — R06.09 DYSPNEA ON EXERTION: ICD-10-CM

## 2023-10-03 DIAGNOSIS — R06.02 SHORTNESS OF BREATH: ICD-10-CM

## 2023-10-03 PROCEDURE — 93321 DOPPLER ECHO F-UP/LMTD STD: CPT | Mod: TC

## 2023-10-03 PROCEDURE — 93325 DOPPLER ECHO COLOR FLOW MAPG: CPT | Mod: TC

## 2023-10-03 PROCEDURE — 93321 DOPPLER ECHO F-UP/LMTD STD: CPT | Mod: 26 | Performed by: INTERNAL MEDICINE

## 2023-10-03 PROCEDURE — 93016 CV STRESS TEST SUPVJ ONLY: CPT | Performed by: INTERNAL MEDICINE

## 2023-10-03 PROCEDURE — 93018 CV STRESS TEST I&R ONLY: CPT | Performed by: INTERNAL MEDICINE

## 2023-10-03 PROCEDURE — 93350 STRESS TTE ONLY: CPT | Mod: 26 | Performed by: INTERNAL MEDICINE

## 2023-10-03 PROCEDURE — 93325 DOPPLER ECHO COLOR FLOW MAPG: CPT | Mod: 26 | Performed by: INTERNAL MEDICINE

## 2023-10-03 NOTE — TELEPHONE ENCOUNTER
Stress echo for review: Interpretation Summary     Exercise stress echocardiogram non-diagnostic for the evaluation of myocardial  ischemia.  Target heart rate achieved (113 bpm target), however images were obtained at  significantly below target HR (74-98 bpm).  Stress test terminated due to fatigue.  Normal resting LV function with EF of approximately 60-65%; post stress LV  function is stable with EF of approximately 60-65%, without change in chamber  size.  Normal wall motion at rest and post stress.  Normal blood pressure response to exercise.  Rest ECG with low voltage, frequent ectopy in bigimeny.  Stress ECG negative for myocardial ischemia.  Patient experiecned 3/10 chest pain at peak stress.     On routine screening 2D echocardiogram and Doppler examination, normal  biventricular function, no significant valvular dysfunction, aortic root  normal in size.  _  Did speak with Pt regarding CP 3/10. Pt said she had some heaviness, but no CP. Offered nitroglycerin Pt declined. Pt told to call clinic if any symptoms progress.   MARCIA Ro RN

## 2023-10-10 ENCOUNTER — TELEPHONE (OUTPATIENT)
Dept: CARDIOLOGY | Facility: CLINIC | Age: 88
End: 2023-10-10
Payer: COMMERCIAL

## 2023-10-10 NOTE — TELEPHONE ENCOUNTER
Called Pt informed her of stress echo not being:    Exercise stress echocardiogram non-diagnostic for the evaluation of myocardial  ischemia.  Target heart rate achieved (113 bpm target), however images were obtained at  significantly below target HR (74-98 bpm).    Also reviewed Radiology report with Pt: IMPRESSION:   1.  Few indeterminate nodular opacities in the visualized portions of  the lungs. The largest is a 9 mm nodular opacity in the right middle  lobe which may be a focus of bronchial mucus plugging and atelectasis.  Recommend a follow-up CT of the chest in 8-12 weeks.    Pt informed would put order in for recheck of lungs, and have scheduling call her.     Pt states she has occasional SOB , but very seldom, and never CP, arm pain, neck pain, or back pain of any kind.  Pt does not feel that she wants to do further testing of heart, and would prefer to discuss with DR Zelaya at  1/5/24 office visit. MARCIA Ro RN

## 2023-10-10 NOTE — TELEPHONE ENCOUNTER
[facility] Call [facility]    Phone Message    May a detailed message be left on voicemail: yes     Reason for Call: Other: Patient was returning call back to Xuan JAMES. Patient states she does not know what happened to the number she was given. Please call patient back     Action Taken: Other: Cardiology    Travel Screening: Not Applicable

## 2023-10-10 NOTE — CONFIDENTIAL NOTE
Call out to Pt to discuss all testing , and DR Zelaya's recommendations. Left phone number for return call. MARCIA Ro RN

## 2023-10-12 NOTE — TELEPHONE ENCOUNTER
Returned Pt call she left a very lengthy message that her Primary is going to refer her to an oncology DR Terrie del angel? It sounded like for findings on CT scan. Returned call to Pt to verify this and that they would be doing CT scan of chest in 8-12 weeks. MARCIA Ro RN

## 2023-11-04 ENCOUNTER — HEALTH MAINTENANCE LETTER (OUTPATIENT)
Age: 88
End: 2023-11-04

## 2023-12-06 ENCOUNTER — TELEPHONE (OUTPATIENT)
Dept: CARDIOLOGY | Facility: CLINIC | Age: 88
End: 2023-12-06
Payer: COMMERCIAL

## 2023-12-06 ENCOUNTER — ANCILLARY PROCEDURE (OUTPATIENT)
Dept: CT IMAGING | Facility: CLINIC | Age: 88
End: 2023-12-06
Attending: NURSE PRACTITIONER
Payer: COMMERCIAL

## 2023-12-06 DIAGNOSIS — R91.8 PULMONARY NODULES: ICD-10-CM

## 2023-12-06 PROCEDURE — 71250 CT THORAX DX C-: CPT

## 2023-12-06 NOTE — TELEPHONE ENCOUNTER
3 month Follow up chest CT:  changes noted  Narrative & Impression  EXAM: CT CHEST W/O CONTRAST  LOCATION: [facility]  DATE: 12/6/2023     INDICATION: Pulmonary nodules.  COMPARISON: CT cardiac scan 09/26/2023.  TECHNIQUE: CT chest without IV contrast. Multiplanar reformats were obtained. Dose reduction techniques were used.  CONTRAST: None.     FINDINGS:   LUNGS AND PLEURA: No effusion or acute airspace disease. Several pulmonary nodules noted. Some are calcified granulomas. Noncalcified nodule measures 7 mm and is solid at the right lower lobe, previously 6 mm series 4 image 203. Focal opacity medial   right middle lobe is 16 mm, previously 9 mm series 4 image 209. This could represent a cluster of nodularity as well. There are adjacent satellite nodules in this region. Small nodularity along the superior right lower lobe noted with a region of faint   calcification and this measures 11 mm image 71. Biapical pleural nodularity with calcification.     MEDIASTINUM/AXILLAE: No adenopathy. No acute mediastinal abnormality.     CORONARY ARTERY CALCIFICATION: Severe versus stents.     UPPER ABDOMEN: No significant finding.     MUSCULOSKELETAL: Unremarkable.                                                                      IMPRESSION: Several indeterminate pulmonary nodules. Largest is along the medial right lower lobe that could represent a cluster of nodules. This is larger as compared to 09/26/2023 and could represent an infectious, inflammatory, or neoplastic etiology.   There are other indeterminate nodules as well. Recommend short interval follow-up CT chest in 3 months. Further workup may be necessary.        MARCIA Ro RN

## 2023-12-07 NOTE — TELEPHONE ENCOUNTER
Per DR Zelaya did call Pt and informed her of results. And Did call PMD's office and informed them also of results.  They

## 2023-12-26 ENCOUNTER — TELEPHONE (OUTPATIENT)
Dept: CARDIOLOGY | Facility: CLINIC | Age: 88
End: 2023-12-26

## 2023-12-26 ENCOUNTER — OFFICE VISIT (OUTPATIENT)
Dept: CARDIOLOGY | Facility: CLINIC | Age: 88
End: 2023-12-26
Attending: NURSE PRACTITIONER
Payer: COMMERCIAL

## 2023-12-26 VITALS
SYSTOLIC BLOOD PRESSURE: 146 MMHG | DIASTOLIC BLOOD PRESSURE: 70 MMHG | WEIGHT: 137.5 LBS | BODY MASS INDEX: 22.1 KG/M2 | HEIGHT: 66 IN | OXYGEN SATURATION: 95 % | HEART RATE: 61 BPM

## 2023-12-26 DIAGNOSIS — I34.0 NONRHEUMATIC MITRAL VALVE REGURGITATION: ICD-10-CM

## 2023-12-26 DIAGNOSIS — R06.09 DYSPNEA ON EXERTION: ICD-10-CM

## 2023-12-26 DIAGNOSIS — R94.39 ABNORMAL CARDIOVASCULAR STRESS TEST: ICD-10-CM

## 2023-12-26 DIAGNOSIS — R60.0 PERIPHERAL EDEMA: ICD-10-CM

## 2023-12-26 DIAGNOSIS — I10 BENIGN ESSENTIAL HYPERTENSION: ICD-10-CM

## 2023-12-26 DIAGNOSIS — I25.10 CORONARY ARTERY DISEASE INVOLVING NATIVE CORONARY ARTERY OF NATIVE HEART WITHOUT ANGINA PECTORIS: Primary | ICD-10-CM

## 2023-12-26 DIAGNOSIS — E78.5 HYPERLIPIDEMIA LDL GOAL <70: ICD-10-CM

## 2023-12-26 PROCEDURE — 99215 OFFICE O/P EST HI 40 MIN: CPT | Performed by: INTERNAL MEDICINE

## 2023-12-26 NOTE — TELEPHONE ENCOUNTER
Jomar Zelaya MD  [facility] Heart Team 2  Please obtain records from Dr. Ivan Manzano's office regarding evaluation of her pulmonary nodules.      [phone] faxed a request to Dr. House at [facility] [phone] to request the office visit note from 12/13/2023.

## 2023-12-26 NOTE — PROGRESS NOTES
HPI and Plan:   Mariam originally sought me out in June 2022  as a second opinion and transfer of care.  She formerly followed through [facility].  Her daughter knows my wife and neighbor, Corrie Molina.      Mariam's past medical history is significant for hypertension, hyperlipidemia, hypothyroidism, Parkinson's disease, coronary artery disease and now some suspicious pulmonary nodules.     Her coronary history dates back to 01/2020, when she had 5 drug-eluting stents placed to her proximal and mid right coronary artery as well as ostial, proximal and mid circumflex.  In 09/2020, due to recurrent exertional symptoms, a coronary CT angiogram appeared to demonstrate severe stenosis in the left anterior descending artery.  Angiography demonstrated iFR-negative disease in the LAD and she underwent angioplasty to in-stent restenosis in her left circumflex coronary artery.  Her  angiogram was complicated by a shower of small strokes noted by MRI.  Her last angiogram also noted a second obtuse marginal that was totally occluded.  Her symptoms included diplopia and right-sided facial droop.     [facility] with a complaint of decreasing exercise tolerance and dyspnea on exertion for which she was set up for a CT angiogram which unfortunately due to her multiple stents and calcium was uninterpretable.  She then had a stress echocardiogram for which she was unable to achieve target heart rate.  She had no EKG changes did experience 3/10 chest discomfort without any obvious ischemia on her echo portion.    CT angiogram did demonstrate some pulmonary nodules for which she underwent follow-up earlier this month which appeared to show worsening nodules.  She has seen Dr. Ivan Manzano who recommended follow-up CT scan in 6 months.    She returns to clinic and states she thinks she is doing fine.  She does not think her exercise tolerance is inappropriate for her age.  She is having no chest arm  neck jaw or shoulder discomfort.     ASSESSMENT/PLAN:  Mariam appears to be doing well from a cardiac standpoint without clinical evidence of ischemia.  We discussed her stress test at this time she does not want to pursue any further stress testing.  In September she also had a full echo demonstrated ejection fraction of 60 to 65% with mild mitral valve prolapse associate with mild mitral regurgitation.  Inferior vena cava was described as normal.     She has no symptoms to suggest heart failure or significant arrhythmia.    Blood pressure remains high despite the addition of hydrochlorothiazide.  She states blood pressure when measured at her senior living is usually in the 130s.  She is not in interested in adding any more medication     I congratulated her on her exercise regimen and encouraged her to continue to do so.     Most recent fasting lipid profile in care everywhere shows a total cholesterol of 142 and HDL of 66 LDL of 61 and triglycerides of 74.  We talked a bit about the LDL hypothesis and whether we should try to drive her LDL lower as recent guidelines now recommends below 55.  She does not want to intensify her cholesterol management at this time.     We reviewed all of her medications.  I will continue them all as is.  After discussing many topics we decided we will continue as is.  I will see her back in 6 months.  If she should have any problems I would like to see her sooner.     Thank you for allowing me to participate in her care.     Jomar Zelaya MD, [facility] with [facility]       Today's clinic visit entailed:  Review of the result(s) of each unique test - stress echo, CT angiogram, echocardiogram, lab work  Ordering of each unique test  Prescription drug management  42 minutes spent by me on the date of the encounter doing chart review, history and exam, documentation and further activities per the note  Provider  Link to [facility] Help Grid     The level of medical decision making during this visit was  of moderate complexity.      No orders of the defined types were placed in this encounter.      No orders of the defined types were placed in this encounter.      There are no discontinued medications.      Encounter Diagnoses   Name Primary?    Dyspnea on exertion     Coronary artery disease involving native coronary artery of native heart without angina pectoris Yes    Hyperlipidemia LDL goal <70     Nonrheumatic mitral valve regurgitation     Benign essential hypertension     Peripheral edema     Abnormal cardiovascular stress test        CURRENT MEDICATIONS:  Current Outpatient Medications   Medication Sig Dispense Refill    amLODIPine (NORVASC) 5 MG tablet Take 5 mg by mouth daily      ASPIRIN ADULT LOW STRENGTH PO Take 81 mg by mouth daily      carbidopa-levodopa (SINEMET)  MG tablet TAKE 1 TABLET BY MOUTH TWICE A DAY      Citalopram Hydrobromide (CELEXA PO) Take 10 mg by mouth      hydrochlorothiazide (HYDRODIURIL) 12.5 MG tablet Take 1 tablet (12.5 mg) by mouth daily 90 tablet 4    levothyroxine (SYNTHROID/LEVOTHROID) 25 MCG tablet TAKE 1 TABLET (25 MCG) BY MOUTH BEFORE BREAKFAST.      metoprolol succinate ER (TOPROL XL) 25 MG 24 hr tablet Take 1 tablet (25 mg) by mouth daily 90 tablet 3    oxybutynin (DITROPAN-XL) 5 MG 24 hr tablet Take by mouth daily      rosuvastatin (CRESTOR) 20 MG tablet Take 20 mg by mouth At Bedtime      Vitamin D3 (CHOLECALCIFEROL) 25 mcg (1000 units) tablet Take by mouth daily         ALLERGIES     Allergies   Allergen Reactions    Atorvastatin Muscle Pain (Myalgia)    Evolocumab Other (See Comments)     Stiff neck, stiff jaw, runny nose       PAST MEDICAL HISTORY:  Past Medical History:   Diagnosis Date    Anemia     Carotid artery plaque     Coronary artery disease involving native coronary artery of native heart without angina pectoris     cardiac cath 9/2020 @ [facility]: MARCIE to circumflex; cath 1/2020 @ [facility]: MARCIE x2 to RCA and MARCIE x3 to circumflex    Depressive disorder      "Gout     Hyperlipidemia LDL goal <70     Hypertension     Hypothyroidism     Mitral valve regurgitation     Spinal stenosis     lumbar ablation 3/2021 @ [facility]    Tremor        PAST SURGICAL HISTORY:  Past Surgical History:   Procedure Laterality Date    BREAST SURGERY      breast reduction    COLONOSCOPY N/A 12/8/2015    Procedure: COLONOSCOPY;  Surgeon: Anand Cameron MD;  Location:  GI    ENT SURGERY      tonsillectomy       FAMILY HISTORY:  History reviewed. No pertinent family history.    SOCIAL HISTORY:  Social History     Socioeconomic History    Marital status: Single     Spouse name: None    Number of children: None    Years of education: None    Highest education level: None   Tobacco Use    Smoking status: Never    Smokeless tobacco: Never   Substance and Sexual Activity    Alcohol use: Not Currently     Comment: about once every 3 months       Review of Systems:  Skin:  Negative       Eyes:  Negative      ENT:  Negative      Respiratory:  Negative       Cardiovascular:  Negative for;chest pain;palpitations;lightheadedness;dizziness;syncope or near-syncope;edema Positive for;fatigue tired all the time -- sleeps about 10-12 hours per night, then can go to sleep again 2 hours later.  Gastroenterology: Negative      Genitourinary:  Negative      Musculoskeletal:  Negative arthritis    Neurologic:  Negative headaches    Psychiatric:  Negative      Heme/Lymph/Imm:  Negative allergies    Endocrine:  Negative thyroid disorder      Physical Exam:  Vitals: BP (!) 146/70 (BP Location: Left arm, Patient Position: Sitting)   Pulse 61   Ht 1.676 m (5' 6\")   Wt 62.4 kg (137 lb 8 oz)   SpO2 95%   BMI 22.19 kg/m      Constitutional:  cooperative, alert and oriented, well developed, well nourished, in no acute distress appears younger than stated age      Skin:  warm and dry to the touch, no apparent skin lesions or masses noted          Head:  normocephalic, no masses or lesions        Eyes:  pupils equal " and round, conjunctivae and lids unremarkable, sclera white, no xanthalasma, EOMS intact, no nystagmus        Lymph:      ENT:  no pallor or cyanosis, dentition good        Neck:  carotid pulses are full and equal bilaterally;no carotid bruit        Respiratory:  normal breath sounds, clear to auscultation, normal A-P diameter, normal symmetry, normal respiratory excursion, no use of accessory muscles         Cardiac: regular rhythm;no murmurs, gallops or rubs detected occasional premature beats              pulses full and equal                                        GI:           Extremities and Muscular Skeletal:  no edema;no spinal abnormalities noted;normal muscle strength and tone              Neurological:  no gross motor deficits        Psych:  affect appropriate, oriented to time, person and place        JESS Yancey CNP  [address]  [place] [address]

## 2023-12-26 NOTE — LETTER
12/26/2023    Edouard Wolf MD  [address] [place] [address]    RE: Mariam J Doe       Dear Colleague,     I had the pleasure of seeing Mariam Azar in the [facility].  HPI and Plan:   Mariam originally sought me out in June 2022  as a second opinion and transfer of care.  She formerly followed through [facility].  Her daughter knows my wife and neighbor, Corrie Molina.      Mariam's past medical history is significant for hypertension, hyperlipidemia, hypothyroidism, Parkinson's disease, coronary artery disease and now some suspicious pulmonary nodules.     Her coronary history dates back to 01/2020, when she had 5 drug-eluting stents placed to her proximal and mid right coronary artery as well as ostial, proximal and mid circumflex.  In 09/2020, due to recurrent exertional symptoms, a coronary CT angiogram appeared to demonstrate severe stenosis in the left anterior descending artery.  Angiography demonstrated iFR-negative disease in the LAD and she underwent angioplasty to in-stent restenosis in her left circumflex coronary artery.  Her  angiogram was complicated by a shower of small strokes noted by MRI.  Her last angiogram also noted a second obtuse marginal that was totally occluded.  Her symptoms included diplopia and right-sided facial droop.     [facility] with a complaint of decreasing exercise tolerance and dyspnea on exertion for which she was set up for a CT angiogram which unfortunately due to her multiple stents and calcium was uninterpretable.  She then had a stress echocardiogram for which she was unable to achieve target heart rate.  She had no EKG changes did experience 3/10 chest discomfort without any obvious ischemia on her echo portion.    CT angiogram did demonstrate some pulmonary nodules for which she underwent follow-up earlier this month which appeared to show worsening nodules.  She has seen Dr. Ivan Manzano  who recommended follow-up CT scan in 6 months.    She returns to clinic and states she thinks she is doing fine.  She does not think her exercise tolerance is inappropriate for her age.  She is having no chest arm neck jaw or shoulder discomfort.     ASSESSMENT/PLAN:  Mariam appears to be doing well from a cardiac standpoint without clinical evidence of ischemia.  We discussed her stress test at this time she does not want to pursue any further stress testing.  In September she also had a full echo demonstrated ejection fraction of 60 to 65% with mild mitral valve prolapse associate with mild mitral regurgitation.  Inferior vena cava was described as normal.     She has no symptoms to suggest heart failure or significant arrhythmia.    Blood pressure remains high despite the addition of hydrochlorothiazide.  She states blood pressure when measured at her senior living is usually in the 130s.  She is not in interested in adding any more medication     I congratulated her on her exercise regimen and encouraged her to continue to do so.     Most recent fasting lipid profile in care everywhere shows a total cholesterol of 142 and HDL of 66 LDL of 61 and triglycerides of 74.  We talked a bit about the LDL hypothesis and whether we should try to drive her LDL lower as recent guidelines now recommends below 55.  She does not want to intensify her cholesterol management at this time.     We reviewed all of her medications.  I will continue them all as is.  After discussing many topics we decided we will continue as is.  I will see her back in 6 months.  If she should have any problems I would like to see her sooner.     Thank you for allowing me to participate in her care.     Jomar Zelaya MD, [facility]       Today's clinic visit entailed:  Review of the result(s) of each unique test - stress echo, CT angiogram, echocardiogram, lab work  Ordering of each unique test  Prescription drug management  42 minutes spent by me  on the date of the encounter doing chart review, history and exam, documentation and further activities per the note  Provider  Link to MDM Help Grid     The level of medical decision making during this visit was of moderate complexity.      No orders of the defined types were placed in this encounter.      No orders of the defined types were placed in this encounter.      There are no discontinued medications.      Encounter Diagnoses   Name Primary?    Dyspnea on exertion     Coronary artery disease involving native coronary artery of native heart without angina pectoris Yes    Hyperlipidemia LDL goal <70     Nonrheumatic mitral valve regurgitation     Benign essential hypertension     Peripheral edema     Abnormal cardiovascular stress test        CURRENT MEDICATIONS:  Current Outpatient Medications   Medication Sig Dispense Refill    amLODIPine (NORVASC) 5 MG tablet Take 5 mg by mouth daily      ASPIRIN ADULT LOW STRENGTH PO Take 81 mg by mouth daily      carbidopa-levodopa (SINEMET)  MG tablet TAKE 1 TABLET BY MOUTH TWICE A DAY      Citalopram Hydrobromide (CELEXA PO) Take 10 mg by mouth      hydrochlorothiazide (HYDRODIURIL) 12.5 MG tablet Take 1 tablet (12.5 mg) by mouth daily 90 tablet 4    levothyroxine (SYNTHROID/LEVOTHROID) 25 MCG tablet TAKE 1 TABLET (25 MCG) BY MOUTH BEFORE BREAKFAST.      metoprolol succinate ER (TOPROL XL) 25 MG 24 hr tablet Take 1 tablet (25 mg) by mouth daily 90 tablet 3    oxybutynin (DITROPAN-XL) 5 MG 24 hr tablet Take by mouth daily      rosuvastatin (CRESTOR) 20 MG tablet Take 20 mg by mouth At Bedtime      Vitamin D3 (CHOLECALCIFEROL) 25 mcg (1000 units) tablet Take by mouth daily         ALLERGIES     Allergies   Allergen Reactions    Atorvastatin Muscle Pain (Myalgia)    Evolocumab Other (See Comments)     Stiff neck, stiff jaw, runny nose       PAST MEDICAL HISTORY:  Past Medical History:   Diagnosis Date    Anemia     Carotid artery plaque     Coronary artery disease  "involving native coronary artery of native heart without angina pectoris     cardiac cath 9/2020 @ [facility]: MARCIE to circumflex; cath 1/2020 @ [facility]: MARCIE x2 to RCA and MARCIE x3 to circumflex    Depressive disorder     Gout     Hyperlipidemia LDL goal <70     Hypertension     Hypothyroidism     Mitral valve regurgitation     Spinal stenosis     lumbar ablation 3/2021 @ [facility]    Tremor        PAST SURGICAL HISTORY:  Past Surgical History:   Procedure Laterality Date    BREAST SURGERY      breast reduction    COLONOSCOPY N/A 12/8/2015    Procedure: COLONOSCOPY;  Surgeon: Anand Cameron MD;  Location:  GI    ENT SURGERY      tonsillectomy       FAMILY HISTORY:  History reviewed. No pertinent family history.    SOCIAL HISTORY:  Social History     Socioeconomic History    Marital status: Single     Spouse name: None    Number of children: None    Years of education: None    Highest education level: None   Tobacco Use    Smoking status: Never    Smokeless tobacco: Never   Substance and Sexual Activity    Alcohol use: Not Currently     Comment: about once every 3 months       Review of Systems:  Skin:  Negative       Eyes:  Negative      ENT:  Negative      Respiratory:  Negative       Cardiovascular:  Negative for;chest pain;palpitations;lightheadedness;dizziness;syncope or near-syncope;edema Positive for;fatigue tired all the time -- sleeps about 10-12 hours per night, then can go to sleep again 2 hours later.  Gastroenterology: Negative      Genitourinary:  Negative      Musculoskeletal:  Negative arthritis    Neurologic:  Negative headaches    Psychiatric:  Negative      Heme/Lymph/Imm:  Negative allergies    Endocrine:  Negative thyroid disorder      Physical Exam:  Vitals: BP (!) 146/70 (BP Location: Left arm, Patient Position: Sitting)   Pulse 61   Ht 1.676 m (5' 6\")   Wt 62.4 kg (137 lb 8 oz)   SpO2 95%   BMI 22.19 kg/m      Constitutional:  cooperative, alert and oriented, well developed, well " nourished, in no acute distress appears younger than stated age      Skin:  warm and dry to the touch, no apparent skin lesions or masses noted          Head:  normocephalic, no masses or lesions        Eyes:  pupils equal and round, conjunctivae and lids unremarkable, sclera white, no xanthalasma, EOMS intact, no nystagmus        Lymph:      ENT:  no pallor or cyanosis, dentition good        Neck:  carotid pulses are full and equal bilaterally;no carotid bruit        Respiratory:  normal breath sounds, clear to auscultation, normal A-P diameter, normal symmetry, normal respiratory excursion, no use of accessory muscles         Cardiac: regular rhythm;no murmurs, gallops or rubs detected occasional premature beats              pulses full and equal                                        GI:           Extremities and Muscular Skeletal:  no edema;no spinal abnormalities noted;normal muscle strength and tone              Neurological:  no gross motor deficits        Psych:  affect appropriate, oriented to time, person and place        JESS Yancey CNP  [address]  [place] [address]    Thank you for allowing me to participate in the care of your patient.      Sincerely,     Jomar Zelaya MD   [facility]

## 2024-06-04 ENCOUNTER — LAB (OUTPATIENT)
Dept: LAB | Facility: CLINIC | Age: 89
End: 2024-06-04
Payer: COMMERCIAL

## 2024-06-04 DIAGNOSIS — I25.10 CORONARY ARTERY DISEASE INVOLVING NATIVE CORONARY ARTERY OF NATIVE HEART WITHOUT ANGINA PECTORIS: ICD-10-CM

## 2024-06-04 LAB
ALT SERPL W P-5'-P-CCNC: <5 U/L (ref 0–50)
ANION GAP SERPL CALCULATED.3IONS-SCNC: 12 MMOL/L (ref 7–15)
BUN SERPL-MCNC: 20.1 MG/DL (ref 8–23)
CALCIUM SERPL-MCNC: 10 MG/DL (ref 8.8–10.2)
CHLORIDE SERPL-SCNC: 100 MMOL/L (ref 98–107)
CREAT SERPL-MCNC: 1.01 MG/DL (ref 0.51–0.95)
DEPRECATED HCO3 PLAS-SCNC: 30 MMOL/L (ref 22–29)
EGFRCR SERPLBLD CKD-EPI 2021: 54 ML/MIN/1.73M2
GLUCOSE SERPL-MCNC: 96 MG/DL (ref 70–99)
POTASSIUM SERPL-SCNC: 3.3 MMOL/L (ref 3.4–5.3)
SODIUM SERPL-SCNC: 142 MMOL/L (ref 135–145)

## 2024-06-04 PROCEDURE — 84460 ALANINE AMINO (ALT) (SGPT): CPT | Performed by: INTERNAL MEDICINE

## 2024-06-04 PROCEDURE — 80061 LIPID PANEL: CPT | Performed by: INTERNAL MEDICINE

## 2024-06-04 PROCEDURE — 36415 COLL VENOUS BLD VENIPUNCTURE: CPT | Performed by: INTERNAL MEDICINE

## 2024-06-04 PROCEDURE — 80048 BASIC METABOLIC PNL TOTAL CA: CPT | Performed by: INTERNAL MEDICINE

## 2024-06-05 ENCOUNTER — OFFICE VISIT (OUTPATIENT)
Dept: CARDIOLOGY | Facility: CLINIC | Age: 89
End: 2024-06-05
Payer: COMMERCIAL

## 2024-06-05 VITALS
SYSTOLIC BLOOD PRESSURE: 136 MMHG | HEART RATE: 62 BPM | DIASTOLIC BLOOD PRESSURE: 80 MMHG | BODY MASS INDEX: 22.02 KG/M2 | WEIGHT: 137 LBS | HEIGHT: 66 IN

## 2024-06-05 DIAGNOSIS — R94.39 ABNORMAL CARDIOVASCULAR STRESS TEST: ICD-10-CM

## 2024-06-05 DIAGNOSIS — I34.0 NONRHEUMATIC MITRAL VALVE REGURGITATION: ICD-10-CM

## 2024-06-05 DIAGNOSIS — N18.31 CHRONIC RENAL FAILURE, STAGE 3A (H): ICD-10-CM

## 2024-06-05 DIAGNOSIS — E78.5 HYPERLIPIDEMIA LDL GOAL <70: ICD-10-CM

## 2024-06-05 DIAGNOSIS — I10 BENIGN ESSENTIAL HYPERTENSION: ICD-10-CM

## 2024-06-05 DIAGNOSIS — E87.6 HYPOKALEMIA: ICD-10-CM

## 2024-06-05 DIAGNOSIS — R06.09 DYSPNEA ON EXERTION: ICD-10-CM

## 2024-06-05 DIAGNOSIS — I25.10 CORONARY ARTERY DISEASE INVOLVING NATIVE CORONARY ARTERY OF NATIVE HEART WITHOUT ANGINA PECTORIS: Primary | ICD-10-CM

## 2024-06-05 DIAGNOSIS — R60.0 PERIPHERAL EDEMA: ICD-10-CM

## 2024-06-05 PROBLEM — D63.1 ANEMIA OF CHRONIC RENAL FAILURE, STAGE 3A (H): Status: ACTIVE | Noted: 2024-06-05

## 2024-06-05 LAB
CHOLEST SERPL-MCNC: 118 MG/DL
FASTING STATUS PATIENT QL REPORTED: YES
HDLC SERPL-MCNC: 46 MG/DL
LDLC SERPL CALC-MCNC: 49 MG/DL
NONHDLC SERPL-MCNC: 72 MG/DL
TRIGL SERPL-MCNC: 114 MG/DL

## 2024-06-05 PROCEDURE — 99214 OFFICE O/P EST MOD 30 MIN: CPT | Performed by: INTERNAL MEDICINE

## 2024-06-05 PROCEDURE — G2211 COMPLEX E/M VISIT ADD ON: HCPCS | Performed by: INTERNAL MEDICINE

## 2024-06-05 RX ORDER — SPIRONOLACTONE 25 MG/1
25 TABLET ORAL DAILY
Qty: 90 TABLET | Refills: 3 | Status: SHIPPED | OUTPATIENT
Start: 2024-06-05 | End: 2024-06-14

## 2024-06-05 NOTE — LETTER
6/5/2024    Edouard Wolf MD  [address] [place] [address]    RE: Mariam J Doe       Dear Colleague,     I had the pleasure of seeing Mariam Azar in the [facility].  HPI and Plan:   Mariam is a delightful 88-year-old woman who originally sought me out in June 2022  as a second opinion and transfer of care.  She formerly followed through [facility].  Her daughter knows my wife and neighbor, Corrie Molina.      Mariam's past medical history is significant for hypertension, hyperlipidemia, hypothyroidism, Parkinson's disease, coronary artery disease and  suspicious pulmonary nodules for which she is undergoing CT surveillance with Dr. Ivan House..     Her coronary history dates back to 01/2020, when she had 5 drug-eluting stents placed to her proximal and mid right coronary artery as well as ostial, proximal and mid circumflex.  In 09/2020, due to recurrent exertional symptoms, a coronary CT angiogram appeared to demonstrate severe stenosis in the left anterior descending artery.  Angiography demonstrated iFR-negative disease in the LAD and she underwent angioplasty to in-stent restenosis in her left circumflex coronary artery.  Her  angiogram was complicated by a shower of small strokes noted by MRI.  Angiogram also noted a second obtuse marginal that was totally occluded.  Her postprocedure neurologic symptoms included diplopia and right-sided facial droop.     2023 summer, she had a complaint of decreasing exercise tolerance and dyspnea on exertion for which she was set up for a CT angiogram which unfortunately due to her multiple stents and calcium was uninterpretable.  October 2023 she had a stress echocardiogram for which she was able to achieve target heart rate.  She had no EKG changes did experience 3/10 chest discomfort without any obvious ischemia on the echo portion.  The echo pictures however were captured late as she had fast heart rate  recovery.     As stated above CT angiogram did demonstrate some pulmonary nodules for which she underwent follow-up earlier this month which appeared to show worsening nodules.  She has seen Dr. Ivan Manzano who recommended follow-up CT scan in 6 months.     She returns to clinic and states she thinks she is doing fine.  She does not think her exercise tolerance is inappropriate for her age.  She is having no chest arm neck jaw or shoulder discomfort.    In discussion with Mariam she want to continue with a conservative plan and not pursue any further evaluation of her coronary anatomy.    Mariam returns to clinic stating she is doing great.  She states she walks a mile to a mile and a half every day in her senior living center.  This takes about 30 minutes and she has no symptoms.  She denies lightheadedness dizziness syncope or near syncope.  She has no dyspnea on exertion orthopnea or PND.  She notes no side effects or problems with her current medical regiment.  She reports her blood pressure is well-controlled.     ASSESSMENT/PLAN:  Mariam appears to be doing well from a cardiac standpoint without clinical evidence of ischemia.  We will continue with medical management.     September 2023 she also had a full echo demonstrated ejection fraction of 60 to 65% with mild mitral valve prolapse associate with mild mitral regurgitation.  Inferior vena cava was described as normal.     She has no symptoms to suggest heart failure or significant arrhythmia.    Blood pressure is well-controlled and appears to have responded well to the addition of hydrochlorothiazide.  However she does have hypokalemia and I will swap this out for spironolactone 25 mg daily.  I will have her return in 1 month with a BMP and a blood pressure check with my nurse.     I congratulated her on her exercise regimen and encouraged her to continue to do so.     Fasting lipid profile is outstanding with total cholesterol of 118, HDL 46, LDL 49 and  triglycerides of 114.    Creatinine is 1.01 giving her a GFR of 54 consistent with stage IIIa chronic renal failure.  As stated potassium is 3.3.     I will plan on follow-up in 1 year, if she should have any problems I would like to see her sooner.     Thank you for allowing me to participate in her care.     Jomar Zelaya MD, [facility]    The longitudinal plan of care for the diagnosis(es)/condition(s) as documented were addressed during this visit. Due to the added complexity in care, I will continue to support Mariam in the subsequent management and with ongoing continuity of care.         Today's clinic visit entailed:  Review of the result(s) of each unique test - lab work, stress echo, echo  Ordering of each unique test  Prescription drug management  32 minutes spent by me on the date of the encounter doing chart review, history and exam, documentation and further activities per the note  Provider  Link to [facility] Help Grid     The level of medical decision making during this visit was of moderate complexity.      Orders Placed This Encounter   Procedures    Basic metabolic panel    Follow-Up with Cardiology Nurse       Orders Placed This Encounter   Medications    Cyanocobalamin (VITAMIN B-12 PO)    spironolactone (ALDACTONE) 25 MG tablet     Sig: Take 1 tablet (25 mg) by mouth daily     Dispense:  90 tablet     Refill:  3       Medications Discontinued During This Encounter   Medication Reason    hydrochlorothiazide (HYDRODIURIL) 12.5 MG tablet          Encounter Diagnoses   Name Primary?    Benign essential hypertension     Coronary artery disease involving native coronary artery of native heart without angina pectoris Yes    Hyperlipidemia LDL goal <70     Nonrheumatic mitral valve regurgitation     Peripheral edema     Dyspnea on exertion     Abnormal cardiovascular stress test     Hypokalemia        CURRENT MEDICATIONS:  Current Outpatient Medications   Medication Sig Dispense Refill    amLODIPine  (NORVASC) 5 MG tablet Take 5 mg by mouth daily      ASPIRIN ADULT LOW STRENGTH PO Take 81 mg by mouth daily      carbidopa-levodopa (SINEMET)  MG tablet TAKE 1 TABLET BY MOUTH TWICE A DAY      Citalopram Hydrobromide (CELEXA PO) Take 10 mg by mouth      Cyanocobalamin (VITAMIN B-12 PO)       levothyroxine (SYNTHROID/LEVOTHROID) 25 MCG tablet TAKE 1 TABLET (25 MCG) BY MOUTH BEFORE BREAKFAST.      metoprolol succinate ER (TOPROL XL) 25 MG 24 hr tablet Take 1 tablet (25 mg) by mouth daily 90 tablet 3    oxybutynin (DITROPAN-XL) 5 MG 24 hr tablet Take by mouth daily      rosuvastatin (CRESTOR) 20 MG tablet Take 20 mg by mouth At Bedtime      spironolactone (ALDACTONE) 25 MG tablet Take 1 tablet (25 mg) by mouth daily 90 tablet 3    Vitamin D3 (CHOLECALCIFEROL) 25 mcg (1000 units) tablet Take by mouth daily         ALLERGIES     Allergies   Allergen Reactions    Atorvastatin Muscle Pain (Myalgia)    Evolocumab Other (See Comments)     Stiff neck, stiff jaw, runny nose       PAST MEDICAL HISTORY:  Past Medical History:   Diagnosis Date    Anemia     Carotid artery plaque     Coronary artery disease involving native coronary artery of native heart without angina pectoris     cardiac cath 9/2020 @ [facility]: MARCIE to circumflex; cath 1/2020 @ [facility]: MARCIE x2 to RCA and MARCIE x3 to circumflex    Depressive disorder     Gout     Hyperlipidemia LDL goal <70     Hypertension     Hypothyroidism     Mitral valve regurgitation     Spinal stenosis     lumbar ablation 3/2021 @ [facility]    Tremor        PAST SURGICAL HISTORY:  Past Surgical History:   Procedure Laterality Date    BREAST SURGERY      breast reduction    COLONOSCOPY N/A 12/8/2015    Procedure: COLONOSCOPY;  Surgeon: Anand Cameron MD;  Location:  GI    ENT SURGERY      tonsillectomy       FAMILY HISTORY:  History reviewed. No pertinent family history.    SOCIAL HISTORY:  Social History     Socioeconomic History    Marital status: Single     Spouse name: None     "Number of children: None    Years of education: None    Highest education level: None   Tobacco Use    Smoking status: Never    Smokeless tobacco: Never   Substance and Sexual Activity    Alcohol use: Not Currently     Comment: about once every 3 months     Social Determinants of Health      [facility]    Financial Resource Strain    [facility]    Social Connections       Review of Systems:  Skin:          Eyes:         ENT:         Respiratory:  Negative       Cardiovascular:  Negative      Gastroenterology:        Genitourinary:         Musculoskeletal:         Neurologic:         Psychiatric:         Heme/Lymph/Imm:         Endocrine:  Negative thyroid disorder;diabetes      Physical Exam:  Vitals: /80   Pulse 62   Ht 1.676 m (5' 6\")   Wt 62.1 kg (137 lb)   BMI 22.11 kg/m      Constitutional:  cooperative, alert and oriented, well developed, well nourished, in no acute distress appears younger than stated age      Skin:  warm and dry to the touch, no apparent skin lesions or masses noted          Head:  normocephalic, no masses or lesions        Eyes:  pupils equal and round, conjunctivae and lids unremarkable, sclera white, no xanthalasma, EOMS intact, no nystagmus        Lymph:      ENT:  no pallor or cyanosis, dentition good        Neck:  carotid pulses are full and equal bilaterally;no carotid bruit        Respiratory:  normal breath sounds, clear to auscultation, normal A-P diameter, normal symmetry, normal respiratory excursion, no use of accessory muscles         Cardiac: regular rhythm;no murmurs, gallops or rubs detected occasional premature beats              pulses full and equal                                        GI:           Extremities and Muscular Skeletal:  no edema;no spinal abnormalities " noted;normal muscle strength and tone              Neurological:  no gross motor deficits        Psych:  affect appropriate, oriented to time, person and place        CC  No referring provider defined for this encounter.                  Thank you for allowing me to participate in the care of your patient.      Sincerely,     Jomar Zelaya MD     [facility]

## 2024-06-05 NOTE — PROGRESS NOTES
HPI and Plan:   Mariam is a delightful 88-year-old woman who originally sought me out in June 2022  as a second opinion and transfer of care.  She formerly followed through [facility].  Her daughter knows my wife and neighbor, Corrie Molina.      Mariam's past medical history is significant for hypertension, hyperlipidemia, hypothyroidism, Parkinson's disease, coronary artery disease and  suspicious pulmonary nodules for which she is undergoing CT surveillance with Dr. Ivan House..     Her coronary history dates back to 01/2020, when she had 5 drug-eluting stents placed to her proximal and mid right coronary artery as well as ostial, proximal and mid circumflex.  In 09/2020, due to recurrent exertional symptoms, a coronary CT angiogram appeared to demonstrate severe stenosis in the left anterior descending artery.  Angiography demonstrated iFR-negative disease in the LAD and she underwent angioplasty to in-stent restenosis in her left circumflex coronary artery.  Her  angiogram was complicated by a shower of small strokes noted by MRI.  Angiogram also noted a second obtuse marginal that was totally occluded.  Her postprocedure neurologic symptoms included diplopia and right-sided facial droop.     2023 summer, she had a complaint of decreasing exercise tolerance and dyspnea on exertion for which she was set up for a CT angiogram which unfortunately due to her multiple stents and calcium was uninterpretable.  October 2023 she had a stress echocardiogram for which she was able to achieve target heart rate.  She had no EKG changes did experience 3/10 chest discomfort without any obvious ischemia on the echo portion.  The echo pictures however were captured late as she had fast heart rate recovery.     As stated above CT angiogram did demonstrate some pulmonary nodules for which she underwent follow-up earlier this month which appeared to show worsening nodules.  She has seen Dr. Ivan Manzano who  recommended follow-up CT scan in 6 months.     She returns to clinic and states she thinks she is doing fine.  She does not think her exercise tolerance is inappropriate for her age.  She is having no chest arm neck jaw or shoulder discomfort.    In discussion with Mariam she want to continue with a conservative plan and not pursue any further evaluation of her coronary anatomy.    Mariam returns to clinic stating she is doing great.  She states she walks a mile to a mile and a half every day in her senior living center.  This takes about 30 minutes and she has no symptoms.  She denies lightheadedness dizziness syncope or near syncope.  She has no dyspnea on exertion orthopnea or PND.  She notes no side effects or problems with her current medical regiment.  She reports her blood pressure is well-controlled.     ASSESSMENT/PLAN:  Mariam appears to be doing well from a cardiac standpoint without clinical evidence of ischemia.  We will continue with medical management.     September 2023 she also had a full echo demonstrated ejection fraction of 60 to 65% with mild mitral valve prolapse associate with mild mitral regurgitation.  Inferior vena cava was described as normal.     She has no symptoms to suggest heart failure or significant arrhythmia.    Blood pressure is well-controlled and appears to have responded well to the addition of hydrochlorothiazide.  However she does have hypokalemia and I will swap this out for spironolactone 25 mg daily.  I will have her return in 1 month with a BMP and a blood pressure check with my nurse.     I congratulated her on her exercise regimen and encouraged her to continue to do so.     Fasting lipid profile is outstanding with total cholesterol of 118, HDL 46, LDL 49 and triglycerides of 114.    Creatinine is 1.01 giving her a GFR of 54 consistent with stage IIIa chronic renal failure.  As stated potassium is 3.3.     I will plan on follow-up in 1 year, if she should have any  problems I would like to see her sooner.     Thank you for allowing me to participate in her care.     Jomar Zelaya MD, [facility]    The longitudinal plan of care for the diagnosis(es)/condition(s) as documented were addressed during this visit. Due to the added complexity in care, I will continue to support Mariam in the subsequent management and with ongoing continuity of care.         Today's clinic visit entailed:  Review of the result(s) of each unique test - lab work, stress echo, echo  Ordering of each unique test  Prescription drug management  32 minutes spent by me on the date of the encounter doing chart review, history and exam, documentation and further activities per the note  Provider  Link to [facility] Help Grid     The level of medical decision making during this visit was of moderate complexity.      Orders Placed This Encounter   Procedures    Basic metabolic panel    Follow-Up with Cardiology Nurse       Orders Placed This Encounter   Medications    Cyanocobalamin (VITAMIN B-12 PO)    spironolactone (ALDACTONE) 25 MG tablet     Sig: Take 1 tablet (25 mg) by mouth daily     Dispense:  90 tablet     Refill:  3       Medications Discontinued During This Encounter   Medication Reason    hydrochlorothiazide (HYDRODIURIL) 12.5 MG tablet          Encounter Diagnoses   Name Primary?    Benign essential hypertension     Coronary artery disease involving native coronary artery of native heart without angina pectoris Yes    Hyperlipidemia LDL goal <70     Nonrheumatic mitral valve regurgitation     Peripheral edema     Dyspnea on exertion     Abnormal cardiovascular stress test     Hypokalemia        CURRENT MEDICATIONS:  Current Outpatient Medications   Medication Sig Dispense Refill    amLODIPine (NORVASC) 5 MG tablet Take 5 mg by mouth daily      ASPIRIN ADULT LOW STRENGTH PO Take 81 mg by mouth daily      carbidopa-levodopa (SINEMET)  MG tablet TAKE 1 TABLET BY MOUTH TWICE A DAY      Citalopram  Hydrobromide (CELEXA PO) Take 10 mg by mouth      Cyanocobalamin (VITAMIN B-12 PO)       levothyroxine (SYNTHROID/LEVOTHROID) 25 MCG tablet TAKE 1 TABLET (25 MCG) BY MOUTH BEFORE BREAKFAST.      metoprolol succinate ER (TOPROL XL) 25 MG 24 hr tablet Take 1 tablet (25 mg) by mouth daily 90 tablet 3    oxybutynin (DITROPAN-XL) 5 MG 24 hr tablet Take by mouth daily      rosuvastatin (CRESTOR) 20 MG tablet Take 20 mg by mouth At Bedtime      spironolactone (ALDACTONE) 25 MG tablet Take 1 tablet (25 mg) by mouth daily 90 tablet 3    Vitamin D3 (CHOLECALCIFEROL) 25 mcg (1000 units) tablet Take by mouth daily         ALLERGIES     Allergies   Allergen Reactions    Atorvastatin Muscle Pain (Myalgia)    Evolocumab Other (See Comments)     Stiff neck, stiff jaw, runny nose       PAST MEDICAL HISTORY:  Past Medical History:   Diagnosis Date    Anemia     Carotid artery plaque     Coronary artery disease involving native coronary artery of native heart without angina pectoris     cardiac cath 9/2020 @ [facility]: MARCIE to circumflex; cath 1/2020 @ [facility]: MARCIE x2 to RCA and MARCIE x3 to circumflex    Depressive disorder     Gout     Hyperlipidemia LDL goal <70     Hypertension     Hypothyroidism     Mitral valve regurgitation     Spinal stenosis     lumbar ablation 3/2021 @ [facility]    Tremor        PAST SURGICAL HISTORY:  Past Surgical History:   Procedure Laterality Date    BREAST SURGERY      breast reduction    COLONOSCOPY N/A 12/8/2015    Procedure: COLONOSCOPY;  Surgeon: Anand Cameron MD;  Location:  GI    ENT SURGERY      tonsillectomy       FAMILY HISTORY:  History reviewed. No pertinent family history.    SOCIAL HISTORY:  Social History     Socioeconomic History    Marital status: Single     Spouse name: None    Number of children: None    Years of education: None    Highest education level: None   Tobacco Use    Smoking status: Never    Smokeless tobacco: Never   Substance and Sexual Activity    Alcohol use: Not  "Currently     Comment: about once every 3 months     Social Determinants of Health      Received from [employer], [employer] [email or web address]    Financial Resource Strain    [facility] [employer], [facility]    Social Connections       Review of Systems:  Skin:          Eyes:         ENT:         Respiratory:  Negative       Cardiovascular:  Negative      Gastroenterology:        Genitourinary:         Musculoskeletal:         Neurologic:         Psychiatric:         Heme/Lymph/Imm:         Endocrine:  Negative thyroid disorder;diabetes      Physical Exam:  Vitals: /80   Pulse 62   Ht 1.676 m (5' 6\")   Wt 62.1 kg (137 lb)   BMI 22.11 kg/m      Constitutional:  cooperative, alert and oriented, well developed, well nourished, in no acute distress appears younger than stated age      Skin:  warm and dry to the touch, no apparent skin lesions or masses noted          Head:  normocephalic, no masses or lesions        Eyes:  pupils equal and round, conjunctivae and lids unremarkable, sclera white, no xanthalasma, EOMS intact, no nystagmus        Lymph:      ENT:  no pallor or cyanosis, dentition good        Neck:  carotid pulses are full and equal bilaterally;no carotid bruit        Respiratory:  normal breath sounds, clear to auscultation, normal A-P diameter, normal symmetry, normal respiratory excursion, no use of accessory muscles         Cardiac: regular rhythm;no murmurs, gallops or rubs detected occasional premature beats              pulses full and equal                                        GI:           Extremities and Muscular Skeletal:  no edema;no spinal abnormalities noted;normal muscle strength and tone              Neurological:  no gross motor deficits        Psych:  affect appropriate, oriented to time, person and place        CC  No referring provider defined for this " encounter.

## 2024-06-12 ENCOUNTER — ANCILLARY PROCEDURE (OUTPATIENT)
Dept: CT IMAGING | Facility: CLINIC | Age: 89
End: 2024-06-12
Attending: THORACIC SURGERY (CARDIOTHORACIC VASCULAR SURGERY)
Payer: COMMERCIAL

## 2024-06-12 DIAGNOSIS — R91.1 SOLITARY PULMONARY NODULE: ICD-10-CM

## 2024-06-12 PROCEDURE — 71250 CT THORAX DX C-: CPT

## 2024-06-14 ENCOUNTER — LAB (OUTPATIENT)
Dept: LAB | Facility: CLINIC | Age: 89
End: 2024-06-14
Payer: COMMERCIAL

## 2024-06-14 ENCOUNTER — OFFICE VISIT (OUTPATIENT)
Dept: CARDIOLOGY | Facility: CLINIC | Age: 89
End: 2024-06-14
Payer: COMMERCIAL

## 2024-06-14 VITALS
WEIGHT: 141.3 LBS | SYSTOLIC BLOOD PRESSURE: 140 MMHG | HEART RATE: 65 BPM | BODY MASS INDEX: 22.71 KG/M2 | HEIGHT: 66 IN | DIASTOLIC BLOOD PRESSURE: 78 MMHG

## 2024-06-14 DIAGNOSIS — I10 BENIGN ESSENTIAL HYPERTENSION: ICD-10-CM

## 2024-06-14 DIAGNOSIS — E87.6 HYPOKALEMIA: ICD-10-CM

## 2024-06-14 DIAGNOSIS — I10 BENIGN ESSENTIAL HYPERTENSION: Primary | ICD-10-CM

## 2024-06-14 DIAGNOSIS — R60.0 PERIPHERAL EDEMA: ICD-10-CM

## 2024-06-14 DIAGNOSIS — I25.10 CORONARY ARTERY DISEASE INVOLVING NATIVE CORONARY ARTERY OF NATIVE HEART WITHOUT ANGINA PECTORIS: ICD-10-CM

## 2024-06-14 DIAGNOSIS — E78.5 HYPERLIPIDEMIA LDL GOAL <70: ICD-10-CM

## 2024-06-14 LAB
ANION GAP SERPL CALCULATED.3IONS-SCNC: 10 MMOL/L (ref 7–15)
BUN SERPL-MCNC: 14.7 MG/DL (ref 8–23)
CALCIUM SERPL-MCNC: 9.6 MG/DL (ref 8.8–10.2)
CHLORIDE SERPL-SCNC: 104 MMOL/L (ref 98–107)
CREAT SERPL-MCNC: 0.9 MG/DL (ref 0.51–0.95)
DEPRECATED HCO3 PLAS-SCNC: 27 MMOL/L (ref 22–29)
EGFRCR SERPLBLD CKD-EPI 2021: 62 ML/MIN/1.73M2
GLUCOSE SERPL-MCNC: 97 MG/DL (ref 70–99)
POTASSIUM SERPL-SCNC: 4.6 MMOL/L (ref 3.4–5.3)
SODIUM SERPL-SCNC: 141 MMOL/L (ref 135–145)

## 2024-06-14 PROCEDURE — 80048 BASIC METABOLIC PNL TOTAL CA: CPT

## 2024-06-14 PROCEDURE — 36415 COLL VENOUS BLD VENIPUNCTURE: CPT

## 2024-06-14 PROCEDURE — 99214 OFFICE O/P EST MOD 30 MIN: CPT | Performed by: NURSE PRACTITIONER

## 2024-06-14 RX ORDER — POTASSIUM CHLORIDE 1500 MG/1
20 TABLET, EXTENDED RELEASE ORAL DAILY
Qty: 90 TABLET | Refills: 3 | Status: SHIPPED | OUTPATIENT
Start: 2024-06-14

## 2024-06-14 RX ORDER — POTASSIUM CHLORIDE 1500 MG/1
40 TABLET, EXTENDED RELEASE ORAL DAILY
Qty: 180 TABLET | Refills: 3 | Status: SHIPPED | OUTPATIENT
Start: 2024-06-14 | End: 2024-06-14

## 2024-06-14 RX ORDER — HYDROCHLOROTHIAZIDE 12.5 MG/1
12.5 TABLET ORAL DAILY
Qty: 90 TABLET | Refills: 3 | Status: SHIPPED | OUTPATIENT
Start: 2024-06-14

## 2024-06-14 NOTE — PATIENT INSTRUCTIONS
Thank you for your visit with the [facility] today.    Today's Summary:    Labs today.  I will call you with the results and tell you the next steps of the plan.  Do not make any changes until I call you.    Follow-up:  Cardiology follow up at [facility]: 1-month with nurse and labs (7/8).     Cardiology Scheduling ~[phone]  Cardiology Clinic RN ~ [phone]    It was a pleasure seeing you today.     JESS Churchill, CNP  Certified Nurse Practitioner  [facility]  June 14, 2024  ________________________________________________________

## 2024-06-14 NOTE — PROGRESS NOTES
~Cardiology Clinic Visit~    Primary Cardiologist: Dr. Zelaya  Reason for visit: Symptom review     History of Present Illness  Mariam's past medical history is significant for hypertension, hyperlipidemia, hypothyroidism, Parkinson's disease and coronary artery disease.     In summary, her coronary history dates back to 01/2020, when she had 5 drug-eluting stents placed to her proximal and mid right coronary artery as well as ostial, proximal and mid circumflex.  In 09/2020, due to recurrent exertional symptoms, a coronary CT angiogram appeared to demonstrate severe in-stent restenosis in the left anterior descending artery.  Angiography demonstrated iFR-negative disease in the LAD and she underwent angioplasty to ISR in her left circumflex coronary artery.  Her  angiogram was complicated by a shower of small strokes noted by MRI.  Her last angiogram also noted a second obtuse marginal that was totally occluded.  Her symptoms included diplopia and right-sided facial droop.     Echocardiogram 10/2021 with EF 65%-70% without focal wall motion abnormalities.  She has a sclerotic mitral valve with mild mitral regurgitation.  Pulmonary pressure is estimated to be 29 mmHg plus her right atrial pressure.     She continues to remain active and getting plenty of steps every single day, though does mention she is not walking as regularly as she usually does.  She is aiming to improve upon this.  She feels like her functional capacity is appropriate for her age.     Past follow-up, we have aimed to optimize her blood pressure as historically she has had systolics ranging 140-160s.  Based on high BP readings, we restarted her hydrochlorothiazide 12.5 mg daily.      Interval 06/14/24    Patient was just seen by Dr. Zelaya on 6/5/24.  At that clinic visit, patient stated that she was doing great.  She was walking a mile to a mile and a half every day at her senior living center.  She had no symptoms with this.   She had no lightheadedness, dizziness, syncope or near syncope.  She had noticed dyspnea on exertion, orthopnea or PND.  At that time, she noted no side effects or problems with her current medication regimen -from a cardiac standpoint, this consisted of amlodipine 5 mg daily, Toprol-XL 25 mg daily, rosuvastatin 20 mg daily.  She was noted to have hypokalemia with K3.3 on hydrochlorothiazide.  Given that, this was stopped in favor of spironolactone 25 mg daily.      Today, she mentions that her symptoms are back and she is feeling poorly after the most recent medication change.  Pt indicates her weight is up, blood pressure elevated again and swelling in her ankles has returned.  She has no chest pain or discomfort, no SOB.  __________________________________________________________________         Assessment and Impression:     Coronary artery disease  S/p stents x6, 2020  -No clinical evidence of ischemia.    -No symptoms to suggest heart failure or significant arrhythmia.     Hypertension  -Elevated today; systolic 140s.  -Previously on hydrochlorothiazide 12.5 mg daily -> changed to Spironolactone 25 mg daily in setting of hypokalemia.  -Today with increased swelling, elevated BP, not feeling well with change.  -On amlodipine, toprol XL     Hyperlipidemia, on rosuvastatin, excellent lipid control.         Recommendations and Plan:     Recheck labs today.  Will call patient to discuss results.  Have notified her to not make any medication changes until the labs have been resulted and we connect with her via phone.  Addendum: BMP resulted normal.  Stop Spironolactone.  Start hydrochlorothiazide 12.5 mg daily.  Start KCL 20 mEq daily.  Recheck labs and BP with nurse in 2-weeks.  __________________________________________________________________    Thank you for the opportunity to participate in this pleasant patient's care.    We would be happy to see this patient sooner for any concerns in the meantime.    Gerri  JESS Shetty, CNP   Nurse Practitioner  [facility]    Today's clinic visit entailed:  The following tests were independently interpreted by me as noted in my documentation: labs  Ordering of each unique test  Prescription drug management  The level of medical decision making during this visit was of moderate complexity.  Review of the result(s) of each unique test - cardiac testing, cardiac imaging, labs  Care everywhere reviewed for additional records to facilitate comprehensive patient care.    Orders this Visit:  No orders of the defined types were placed in this encounter.    Orders Placed This Encounter   Medications    hydroCHLOROthiazide 12.5 MG tablet     Sig: Take 1 tablet (12.5 mg) by mouth daily     Dispense:  90 tablet     Refill:  3    DISCONTD: potassium chloride ER (K-TAB) 20 MEQ CR tablet     Sig: Take 2 tablets (40 mEq) by mouth daily     Dispense:  180 tablet     Refill:  3    potassium chloride ER (K-TAB) 20 MEQ CR tablet     Sig: Take 1 tablet (20 mEq) by mouth daily     Dispense:  90 tablet     Refill:  3     Medications Discontinued During This Encounter   Medication Reason    spironolactone (ALDACTONE) 25 MG tablet     potassium chloride ER (K-TAB) 20 MEQ CR tablet      Encounter Diagnoses   Name Primary?    Hypokalemia     Benign essential hypertension Yes    Coronary artery disease involving native coronary artery of native heart without angina pectoris     Hyperlipidemia LDL goal <70     Peripheral edema      CURRENT MEDICATIONS:  Current Outpatient Medications   Medication Sig Dispense Refill    amLODIPine (NORVASC) 5 MG tablet Take 5 mg by mouth daily      ASPIRIN ADULT LOW STRENGTH PO Take 81 mg by mouth daily      carbidopa-levodopa (SINEMET)  MG tablet TAKE 1 TABLET BY MOUTH TWICE A DAY      Citalopram Hydrobromide (CELEXA PO) Take 10 mg by mouth daily      Cyanocobalamin (VITAMIN B-12 PO)       hydroCHLOROthiazide 12.5 MG tablet Take 1 tablet (12.5 mg)  "by mouth daily 90 tablet 3    levothyroxine (SYNTHROID/LEVOTHROID) 25 MCG tablet TAKE 1 TABLET (25 MCG) BY MOUTH BEFORE BREAKFAST.      metoprolol succinate ER (TOPROL XL) 25 MG 24 hr tablet Take 1 tablet (25 mg) by mouth daily 90 tablet 3    oxybutynin (DITROPAN-XL) 5 MG 24 hr tablet Take by mouth daily      potassium chloride ER (K-TAB) 20 MEQ CR tablet Take 1 tablet (20 mEq) by mouth daily 90 tablet 3    rosuvastatin (CRESTOR) 20 MG tablet Take 20 mg by mouth At Bedtime      Vitamin D3 (CHOLECALCIFEROL) 25 mcg (1000 units) tablet Take by mouth daily       ALLERGIES     Allergies   Allergen Reactions    Atorvastatin Muscle Pain (Myalgia)    Evolocumab Other (See Comments)     Stiff neck, stiff jaw, runny nose     PAST MEDICAL, SURGICAL, FAMILY, SOCIAL HISTORY:  History was reviewed and updated as needed, see medical record.    Review of Systems:  A 10-point Review Of Systems is otherwise normal except for that which is noted in the HPI and interval summary.    Physical Exam:    Vitals: BP (!) 140/78   Pulse 65   Ht 1.676 m (5' 6\")   Wt 64.1 kg (141 lb 4.8 oz)   BMI 22.81 kg/m    Constitutional: Appears stated age, well nourished, NAD.  Neck: Supple. Carotid pulses full and equal.   Respiratory: Non-labored. Lungs CTAB.  Cardiovascular: RRR, normal S1 and S2. No M/G/R.  Dependent LE edema.  GI: Soft, non-distended, non-tender.  Skin: Warm and dry.   Musculoskeletal/Extremities: Symmetrical movement.  Neurologic: No gross focal deficits. Alert, awake.  Psychiatric: Affect appropriate. Mentation normal.    Recent Lab Results:  LIPID RESULTS:  Lab Results   Component Value Date    CHOL 118 06/04/2024    CHOL 188 01/08/2020    HDL 46 (L) 06/04/2024    HDL 49 01/08/2020    LDL 49 06/04/2024     01/08/2020    TRIG 114 06/04/2024    TRIG 74 04/05/2023    TRIG 110 01/08/2020     LIVER ENZYME RESULTS:  Lab Results   Component Value Date    AST 24 12/30/2019    ALT <5 06/04/2024    ALT 17 12/30/2019     CBC " "RESULTS:  No results found for: \"WBC\", \"RBC\", \"HGB\", \"HCT\", \"MCV\", \"MCH\", \"MCHC\", \"RDW\", \"PLT\"  BMP RESULTS:  Lab Results   Component Value Date     06/14/2024    POTASSIUM 4.6 06/14/2024    POTASSIUM 4.3 12/30/2019    CHLORIDE 104 06/14/2024    CO2 27 06/14/2024    ANIONGAP 10 06/14/2024    GLC 97 06/14/2024    GLC 98 12/30/2019    BUN 14.7 06/14/2024    CR 0.90 06/14/2024    CR 1.09 12/30/2019    GFRESTIMATED 62 06/14/2024    GFRESTIMATED 48 (L) 12/30/2019    GFRESTBLACK 58 (L) 12/30/2019    RODRIGUE 9.6 06/14/2024      A1C RESULTS:  No results found for: \"A1C\"  INR RESULTS:  No results found for: \"INR\"                  "

## 2024-06-14 NOTE — LETTER
6/14/2024    Edouard Wolf MD  [address] [place] [address]    RE: Mariam Azar       Dear Colleague,     I had the pleasure of seeing Mariam Azar in the [facility].              ~Cardiology Clinic Visit~    Primary Cardiologist: Dr. Zelaya  Reason for visit: Symptom review     History of Present Illness  Mariam's past medical history is significant for hypertension, hyperlipidemia, hypothyroidism, Parkinson's disease and coronary artery disease.     In summary, her coronary history dates back to 01/2020, when she had 5 drug-eluting stents placed to her proximal and mid right coronary artery as well as ostial, proximal and mid circumflex.  In 09/2020, due to recurrent exertional symptoms, a coronary CT angiogram appeared to demonstrate severe in-stent restenosis in the left anterior descending artery.  Angiography demonstrated iFR-negative disease in the LAD and she underwent angioplasty to ISR in her left circumflex coronary artery.  Her  angiogram was complicated by a shower of small strokes noted by MRI.  Her last angiogram also noted a second obtuse marginal that was totally occluded.  Her symptoms included diplopia and right-sided facial droop.     Echocardiogram 10/2021 with EF 65%-70% without focal wall motion abnormalities.  She has a sclerotic mitral valve with mild mitral regurgitation.  Pulmonary pressure is estimated to be 29 mmHg plus her right atrial pressure.     She continues to remain active and getting plenty of steps every single day, though does mention she is not walking as regularly as she usually does.  She is aiming to improve upon this.  She feels like her functional capacity is appropriate for her age.     Past follow-up, we have aimed to optimize her blood pressure as historically she has had systolics ranging 140-160s.  Based on high BP readings, we restarted her hydrochlorothiazide 12.5 mg daily.      Interval 06/14/24    Patient was just  seen by Dr. Zelaya on 6/5/24.  At that clinic visit, patient stated that she was doing great.  She was walking a mile to a mile and a half every day [facility].  She had no symptoms with this.  She had no lightheadedness, dizziness, syncope or near syncope.  She had noticed dyspnea on exertion, orthopnea or PND.  At that time, she noted no side effects or problems with her current medication regimen -from a cardiac standpoint, this consisted of amlodipine 5 mg daily, Toprol-XL 25 mg daily, rosuvastatin 20 mg daily.  She was noted to have hypokalemia with K3.3 on hydrochlorothiazide.  Given that, this was stopped in favor of spironolactone 25 mg daily.      Today, she mentions that her symptoms are back and she is feeling poorly after the most recent medication change.  Pt indicates her weight is up, blood pressure elevated again and swelling in her ankles has returned.  She has no chest pain or discomfort, no SOB.  __________________________________________________________________         Assessment and Impression:     Coronary artery disease  S/p stents x6, 2020  -No clinical evidence of ischemia.    -No symptoms to suggest heart failure or significant arrhythmia.     Hypertension  -Elevated today; systolic 140s.  -Previously on hydrochlorothiazide 12.5 mg daily -> changed to Spironolactone 25 mg daily in setting of hypokalemia.  -Today with increased swelling, elevated BP, not feeling well with change.  -On amlodipine, toprol XL     Hyperlipidemia, on rosuvastatin, excellent lipid control.         Recommendations and Plan:     Recheck labs today.  Will call patient to discuss results.  Have notified her to not make any medication changes until the labs have been resulted and we connect with her via phone.  Addendum: BMP resulted normal.  Stop Spironolactone.  Start hydrochlorothiazide 12.5 mg daily.  Start KCL 20 mEq daily.  Recheck labs and BP with nurse in  2-weeks.  __________________________________________________________________    Thank you for the opportunity to participate in this pleasant patient's care.    We would be happy to see this patient sooner for any concerns in the meantime.    JESS Churchill, CNP   Nurse Practitioner  [facility]    Today's clinic visit entailed:  The following tests were independently interpreted by me as noted in my documentation: labs  Ordering of each unique test  Prescription drug management  The level of medical decision making during this visit was of moderate complexity.  Review of the result(s) of each unique test - cardiac testing, cardiac imaging, labs  Care everywhere reviewed for additional records to facilitate comprehensive patient care.    Orders this Visit:  No orders of the defined types were placed in this encounter.    Orders Placed This Encounter   Medications    hydroCHLOROthiazide 12.5 MG tablet     Sig: Take 1 tablet (12.5 mg) by mouth daily     Dispense:  90 tablet     Refill:  3    DISCONTD: potassium chloride ER (K-TAB) 20 MEQ CR tablet     Sig: Take 2 tablets (40 mEq) by mouth daily     Dispense:  180 tablet     Refill:  3    potassium chloride ER (K-TAB) 20 MEQ CR tablet     Sig: Take 1 tablet (20 mEq) by mouth daily     Dispense:  90 tablet     Refill:  3     Medications Discontinued During This Encounter   Medication Reason    spironolactone (ALDACTONE) 25 MG tablet     potassium chloride ER (K-TAB) 20 MEQ CR tablet      Encounter Diagnoses   Name Primary?    Hypokalemia     Benign essential hypertension Yes    Coronary artery disease involving native coronary artery of native heart without angina pectoris     Hyperlipidemia LDL goal <70     Peripheral edema      CURRENT MEDICATIONS:  Current Outpatient Medications   Medication Sig Dispense Refill    amLODIPine (NORVASC) 5 MG tablet Take 5 mg by mouth daily      ASPIRIN ADULT LOW STRENGTH PO Take 81 mg by mouth daily       "carbidopa-levodopa (SINEMET)  MG tablet TAKE 1 TABLET BY MOUTH TWICE A DAY      Citalopram Hydrobromide (CELEXA PO) Take 10 mg by mouth daily      Cyanocobalamin (VITAMIN B-12 PO)       hydroCHLOROthiazide 12.5 MG tablet Take 1 tablet (12.5 mg) by mouth daily 90 tablet 3    levothyroxine (SYNTHROID/LEVOTHROID) 25 MCG tablet TAKE 1 TABLET (25 MCG) BY MOUTH BEFORE BREAKFAST.      metoprolol succinate ER (TOPROL XL) 25 MG 24 hr tablet Take 1 tablet (25 mg) by mouth daily 90 tablet 3    oxybutynin (DITROPAN-XL) 5 MG 24 hr tablet Take by mouth daily      potassium chloride ER (K-TAB) 20 MEQ CR tablet Take 1 tablet (20 mEq) by mouth daily 90 tablet 3    rosuvastatin (CRESTOR) 20 MG tablet Take 20 mg by mouth At Bedtime      Vitamin D3 (CHOLECALCIFEROL) 25 mcg (1000 units) tablet Take by mouth daily       ALLERGIES     Allergies   Allergen Reactions    Atorvastatin Muscle Pain (Myalgia)    Evolocumab Other (See Comments)     Stiff neck, stiff jaw, runny nose     PAST MEDICAL, SURGICAL, FAMILY, SOCIAL HISTORY:  History was reviewed and updated as needed, see medical record.    Review of Systems:  A 10-point Review Of Systems is otherwise normal except for that which is noted in the HPI and interval summary.    Physical Exam:    Vitals: BP (!) 140/78   Pulse 65   Ht 1.676 m (5' 6\")   Wt 64.1 kg (141 lb 4.8 oz)   BMI 22.81 kg/m    Constitutional: Appears stated age, well nourished, NAD.  Neck: Supple. Carotid pulses full and equal.   Respiratory: Non-labored. Lungs CTAB.  Cardiovascular: RRR, normal S1 and S2. No M/G/R.  Dependent LE edema.  GI: Soft, non-distended, non-tender.  Skin: Warm and dry.   Musculoskeletal/Extremities: Symmetrical movement.  Neurologic: No gross focal deficits. Alert, awake.  Psychiatric: Affect appropriate. Mentation normal.    Recent Lab Results:  LIPID RESULTS:  Lab Results   Component Value Date    CHOL 118 06/04/2024    CHOL 188 01/08/2020    HDL 46 (L) 06/04/2024    HDL 49 01/08/2020 " "   LDL 49 06/04/2024     01/08/2020    TRIG 114 06/04/2024    TRIG 74 04/05/2023    TRIG 110 01/08/2020     LIVER ENZYME RESULTS:  Lab Results   Component Value Date    AST 24 12/30/2019    ALT <5 06/04/2024    ALT 17 12/30/2019     CBC RESULTS:  No results found for: \"WBC\", \"RBC\", \"HGB\", \"HCT\", \"MCV\", \"MCH\", \"MCHC\", \"RDW\", \"PLT\"  BMP RESULTS:  Lab Results   Component Value Date     06/14/2024    POTASSIUM 4.6 06/14/2024    POTASSIUM 4.3 12/30/2019    CHLORIDE 104 06/14/2024    CO2 27 06/14/2024    ANIONGAP 10 06/14/2024    GLC 97 06/14/2024    GLC 98 12/30/2019    BUN 14.7 06/14/2024    CR 0.90 06/14/2024    CR 1.09 12/30/2019    GFRESTIMATED 62 06/14/2024    GFRESTIMATED 48 (L) 12/30/2019    GFRESTBLACK 58 (L) 12/30/2019    RODRIGUE 9.6 06/14/2024      A1C RESULTS:  No results found for: \"A1C\"  INR RESULTS:  No results found for: \"INR\"      Thank you for allowing me to participate in the care of your patient.      Sincerely,     JESS Churchill CNP     [facility]  cc:   JESS Churchill CNP  [address]  [place] [address]      "

## 2024-07-01 DIAGNOSIS — N18.31 CHRONIC RENAL FAILURE, STAGE 3A (H): Primary | ICD-10-CM

## 2024-07-08 ENCOUNTER — LAB (OUTPATIENT)
Dept: LAB | Facility: CLINIC | Age: 89
End: 2024-07-08
Payer: COMMERCIAL

## 2024-07-08 ENCOUNTER — ALLIED HEALTH/NURSE VISIT (OUTPATIENT)
Dept: CARDIOLOGY | Facility: CLINIC | Age: 89
End: 2024-07-08
Attending: INTERNAL MEDICINE
Payer: COMMERCIAL

## 2024-07-08 DIAGNOSIS — I10 BENIGN ESSENTIAL HYPERTENSION: ICD-10-CM

## 2024-07-08 DIAGNOSIS — N18.31 CHRONIC RENAL FAILURE, STAGE 3A (H): ICD-10-CM

## 2024-07-08 LAB
ANION GAP SERPL CALCULATED.3IONS-SCNC: 9 MMOL/L (ref 7–15)
BUN SERPL-MCNC: 20.4 MG/DL (ref 8–23)
CALCIUM SERPL-MCNC: 9.8 MG/DL (ref 8.8–10.2)
CHLORIDE SERPL-SCNC: 102 MMOL/L (ref 98–107)
CREAT SERPL-MCNC: 1 MG/DL (ref 0.51–0.95)
DEPRECATED HCO3 PLAS-SCNC: 29 MMOL/L (ref 22–29)
EGFRCR SERPLBLD CKD-EPI 2021: 54 ML/MIN/1.73M2
GLUCOSE SERPL-MCNC: 99 MG/DL (ref 70–99)
POTASSIUM SERPL-SCNC: 4.6 MMOL/L (ref 3.4–5.3)
SODIUM SERPL-SCNC: 140 MMOL/L (ref 135–145)

## 2024-07-08 PROCEDURE — 80048 BASIC METABOLIC PNL TOTAL CA: CPT | Performed by: NURSE PRACTITIONER

## 2024-07-08 PROCEDURE — 99207 PR NO CHARGE NURSE ONLY: CPT | Performed by: INTERNAL MEDICINE

## 2024-07-08 PROCEDURE — 36415 COLL VENOUS BLD VENIPUNCTURE: CPT | Performed by: NURSE PRACTITIONER

## 2024-07-08 NOTE — PROGRESS NOTES
Last office visit: 6/14/24    Previous blood pressure:      Mm Hg 140/78    Previous heart rate:      bpm 65    Time of reading:    Morning medications were taken at: 9:00a.m.    Today's blood pressure:       mm Hg 133/73    Today's heart rate:       bpm 57    Ordering Provider: Dr. Zelaya    Results sent to team # : 2    Additional comments:          Snehal Wallace LPN

## 2024-07-09 ENCOUNTER — MYC MEDICAL ADVICE (OUTPATIENT)
Dept: CARDIOLOGY | Facility: CLINIC | Age: 89
End: 2024-07-09
Payer: COMMERCIAL

## 2024-07-09 DIAGNOSIS — E87.6 HYPOKALEMIA: Primary | ICD-10-CM

## 2024-11-08 ENCOUNTER — TELEPHONE (OUTPATIENT)
Dept: CARDIOLOGY | Facility: CLINIC | Age: 89
End: 2024-11-08
Payer: COMMERCIAL

## 2024-11-08 NOTE — TELEPHONE ENCOUNTER
[facility] Call Center    Phone Message    May a detailed message be left on voicemail: yes     Reason for Call: Other: Mariam called to pass along a message to her care team with recommendations from her PCP. Mariam states the because her uric acid is mildly elevated, her PCP is recommending she stop her hydrochlorothiazide and her potassium supplement. Mariam states that her PCP would like her to start spironolactone 25mg daily and will follow up with her PCP in one month and get her uric acid level checked again. Please reach out to Mariam with any questions. Thank you!     Action Taken: Other: Cardiology    Travel Screening: Not Applicable    Thank you!  [facility]       Date of Service:

## 2024-12-22 ENCOUNTER — HEALTH MAINTENANCE LETTER (OUTPATIENT)
Age: 89
End: 2024-12-22

## 2024-12-23 ENCOUNTER — TELEPHONE (OUTPATIENT)
Dept: CARDIOLOGY | Facility: CLINIC | Age: 89
End: 2024-12-23
Payer: COMMERCIAL

## 2024-12-23 NOTE — TELEPHONE ENCOUNTER
Returned call to Pt. She states she has a sharp pain running down neck, and a rash only on right side on neck and arm. Informed her she sounds like she has shingles, and she needs to reach out to the PMD for treatment. MARCIA Ro RN

## 2024-12-23 NOTE — TELEPHONE ENCOUNTER
[facility] Call Center    Phone Message    May a detailed message be left on voicemail: yes     Reason for Call: Symptoms or Concerns     If patient has red-flag symptoms, warm transfer to triage line    Current symptom or concern: Pain lower right side of neck  Rash     Symptoms have been present for:  2 day(s)    Has patient previously been seen for this? No    Are there any new or worsening symptoms? Yes:   Saturday: woke up with pain on lower right side of neck. Took pain medication did not seem to work    Sunday: Pain was still bad but rash started. Took pain medication and heat pad    Sunday night: Went away for the most part.     Monday morning: Little pain still. But rash came back.     Patient did not want triage.Would like a call back from nursing         Action Taken: Other: cardiology     Travel Screening: Not Applicable     Date of Service:

## 2025-03-03 ENCOUNTER — LAB (OUTPATIENT)
Dept: LAB | Facility: CLINIC | Age: 89
End: 2025-03-03
Payer: COMMERCIAL

## 2025-03-03 DIAGNOSIS — I25.10 CORONARY ARTERY DISEASE INVOLVING NATIVE CORONARY ARTERY OF NATIVE HEART WITHOUT ANGINA PECTORIS: ICD-10-CM

## 2025-03-03 LAB
ALT SERPL W P-5'-P-CCNC: 6 U/L (ref 0–50)
ANION GAP SERPL CALCULATED.3IONS-SCNC: 11 MMOL/L (ref 7–15)
BUN SERPL-MCNC: 17.4 MG/DL (ref 8–23)
CALCIUM SERPL-MCNC: 9.9 MG/DL (ref 8.8–10.4)
CHLORIDE SERPL-SCNC: 102 MMOL/L (ref 98–107)
CHOLEST SERPL-MCNC: 136 MG/DL
CREAT SERPL-MCNC: 0.9 MG/DL (ref 0.51–0.95)
EGFRCR SERPLBLD CKD-EPI 2021: 61 ML/MIN/1.73M2
FASTING STATUS PATIENT QL REPORTED: YES
GLUCOSE SERPL-MCNC: 98 MG/DL (ref 70–99)
HCO3 SERPL-SCNC: 27 MMOL/L (ref 22–29)
HDLC SERPL-MCNC: 62 MG/DL
LDLC SERPL CALC-MCNC: 57 MG/DL
NONHDLC SERPL-MCNC: 74 MG/DL
POTASSIUM SERPL-SCNC: 4.1 MMOL/L (ref 3.4–5.3)
SODIUM SERPL-SCNC: 140 MMOL/L (ref 135–145)
TRIGL SERPL-MCNC: 84 MG/DL

## 2025-03-03 PROCEDURE — 84460 ALANINE AMINO (ALT) (SGPT): CPT | Performed by: INTERNAL MEDICINE

## 2025-03-03 PROCEDURE — 80061 LIPID PANEL: CPT | Performed by: INTERNAL MEDICINE

## 2025-03-03 PROCEDURE — 36415 COLL VENOUS BLD VENIPUNCTURE: CPT | Performed by: INTERNAL MEDICINE

## 2025-03-03 PROCEDURE — 80048 BASIC METABOLIC PNL TOTAL CA: CPT | Performed by: INTERNAL MEDICINE

## 2025-06-30 ENCOUNTER — ANCILLARY PROCEDURE (OUTPATIENT)
Dept: CT IMAGING | Facility: CLINIC | Age: 89
End: 2025-06-30
Attending: PHYSICIAN ASSISTANT
Payer: COMMERCIAL

## 2025-06-30 DIAGNOSIS — R91.1 SOLITARY PULMONARY NODULE: ICD-10-CM

## 2025-06-30 PROCEDURE — 71250 CT THORAX DX C-: CPT
